# Patient Record
Sex: MALE | Race: WHITE | HISPANIC OR LATINO | URBAN - METROPOLITAN AREA
[De-identification: names, ages, dates, MRNs, and addresses within clinical notes are randomized per-mention and may not be internally consistent; named-entity substitution may affect disease eponyms.]

---

## 2023-05-30 ENCOUNTER — OFFICE VISIT (OUTPATIENT)
Dept: FAMILY MEDICINE CLINIC | Facility: CLINIC | Age: 53
End: 2023-05-30

## 2023-05-30 VITALS
TEMPERATURE: 97.8 F | OXYGEN SATURATION: 98 % | SYSTOLIC BLOOD PRESSURE: 170 MMHG | WEIGHT: 206 LBS | DIASTOLIC BLOOD PRESSURE: 90 MMHG | BODY MASS INDEX: 28.84 KG/M2 | HEART RATE: 84 BPM | HEIGHT: 71 IN

## 2023-05-30 DIAGNOSIS — G62.9 PERIPHERAL POLYNEUROPATHY: ICD-10-CM

## 2023-05-30 DIAGNOSIS — L98.9 NON-HEALING SKIN LESION: ICD-10-CM

## 2023-05-30 DIAGNOSIS — S40.812A ABRASION OF SKIN OF LEFT UPPER ARM: ICD-10-CM

## 2023-05-30 DIAGNOSIS — Z12.5 PROSTATE CANCER SCREENING: ICD-10-CM

## 2023-05-30 DIAGNOSIS — Z12.11 COLON CANCER SCREENING: Primary | ICD-10-CM

## 2023-05-30 DIAGNOSIS — I10 PRIMARY HYPERTENSION: ICD-10-CM

## 2023-05-30 DIAGNOSIS — N40.1 BENIGN PROSTATIC HYPERPLASIA WITH LOWER URINARY TRACT SYMPTOMS, SYMPTOM DETAILS UNSPECIFIED: ICD-10-CM

## 2023-05-30 DIAGNOSIS — E78.00 HYPERCHOLESTEREMIA: ICD-10-CM

## 2023-05-30 DIAGNOSIS — E11.65 UNCONTROLLED TYPE 2 DIABETES MELLITUS WITH HYPERGLYCEMIA (HCC): ICD-10-CM

## 2023-05-30 RX ORDER — ATORVASTATIN CALCIUM 20 MG/1
20 TABLET, FILM COATED ORAL DAILY
Qty: 90 TABLET | Refills: 3 | Status: SHIPPED | OUTPATIENT
Start: 2023-05-30

## 2023-05-30 RX ORDER — GABAPENTIN 300 MG/1
300 CAPSULE ORAL 2 TIMES DAILY
COMMUNITY
End: 2023-05-30 | Stop reason: SDUPTHER

## 2023-05-30 RX ORDER — GABAPENTIN 300 MG/1
300 CAPSULE ORAL 2 TIMES DAILY
Qty: 180 CAPSULE | Refills: 3 | Status: SHIPPED | OUTPATIENT
Start: 2023-05-30

## 2023-05-30 RX ORDER — MIRTAZAPINE 15 MG/1
1 TABLET, FILM COATED ORAL DAILY
COMMUNITY
Start: 2023-05-30

## 2023-05-30 RX ORDER — GLIPIZIDE 10 MG/1
1 TABLET ORAL 2 TIMES DAILY
COMMUNITY
Start: 2023-05-23

## 2023-05-30 RX ORDER — BUPROPION HYDROCHLORIDE 150 MG/1
2 TABLET ORAL DAILY
COMMUNITY
Start: 2023-05-16

## 2023-05-30 RX ORDER — LISINOPRIL AND HYDROCHLOROTHIAZIDE 25; 20 MG/1; MG/1
1 TABLET ORAL DAILY
COMMUNITY

## 2023-05-30 RX ORDER — CARVEDILOL 6.25 MG/1
1 TABLET ORAL 2 TIMES DAILY
COMMUNITY

## 2023-05-30 RX ORDER — FUROSEMIDE 20 MG/1
20 TABLET ORAL
Qty: 90 TABLET | Refills: 3 | Status: SHIPPED | OUTPATIENT
Start: 2023-05-30

## 2023-05-30 RX ORDER — INSULIN HUMAN 100 [IU]/ML
20 INJECTION, SUSPENSION SUBCUTANEOUS
COMMUNITY
Start: 2023-05-16

## 2023-05-30 RX ORDER — CLONIDINE HYDROCHLORIDE 0.1 MG/1
1 TABLET ORAL DAILY
COMMUNITY
Start: 2023-05-16

## 2023-05-30 RX ORDER — LEVOTHYROXINE SODIUM 0.12 MG/1
125 TABLET ORAL DAILY
COMMUNITY

## 2023-05-30 RX ORDER — PEN NEEDLE, DIABETIC 29 G X1/2"
NEEDLE, DISPOSABLE MISCELLANEOUS 2 TIMES DAILY
COMMUNITY
Start: 2023-05-16

## 2023-05-30 RX ORDER — BUPRENORPHINE AND NALOXONE 8; 2 MG/1; MG/1
3 FILM, SOLUBLE BUCCAL; SUBLINGUAL DAILY
COMMUNITY
Start: 2023-05-26

## 2023-05-30 RX ORDER — FUROSEMIDE 20 MG/1
20 TABLET ORAL
COMMUNITY
End: 2023-05-30 | Stop reason: SDUPTHER

## 2023-05-30 RX ORDER — NALOXONE HYDROCHLORIDE 4 MG/.1ML
1 SPRAY NASAL ONCE AS NEEDED
COMMUNITY
Start: 2023-05-16

## 2023-05-30 RX ORDER — ATORVASTATIN CALCIUM 20 MG/1
20 TABLET, FILM COATED ORAL DAILY
COMMUNITY
End: 2023-05-30 | Stop reason: SDUPTHER

## 2023-05-30 RX ORDER — TAMSULOSIN HYDROCHLORIDE 0.4 MG/1
1 CAPSULE ORAL DAILY
COMMUNITY
Start: 2023-05-16

## 2023-05-30 RX ORDER — HYDRALAZINE HYDROCHLORIDE 25 MG/1
25 TABLET, FILM COATED ORAL 3 TIMES DAILY
COMMUNITY

## 2023-05-30 RX ORDER — ONDANSETRON 4 MG/1
1 TABLET, ORALLY DISINTEGRATING ORAL EVERY 8 HOURS PRN
COMMUNITY
Start: 2023-05-16

## 2023-05-30 NOTE — PROGRESS NOTES
Name: Milan Davila      : 1970      MRN: [id number]  Encounter Provider: Julieth Jacobs DO  Encounter Date: 2023   Encounter department: [facility]     Chief Complaint   Patient presents with   • New Patient Visit   • Establish Care   • Arm Pain     X 1 month/L side   BMI Counseling: Body mass index is 28 73 kg/m²  The BMI is above normal  Nutrition recommendations include reducing portion sizes, consuming healthier snacks, decreasing soda and/or juice intake, moderation in carbohydrate intake and increasing intake of lean protein  1  Colon cancer screening  -     Cologuard    2  Hypercholesteremia  -     atorvastatin (LIPITOR) 20 mg tablet; Take 1 tablet (20 mg total) by mouth daily  -     Lipid panel; Future    3  Peripheral polyneuropathy  -     gabapentin (NEURONTIN) 300 mg capsule; Take 1 capsule (300 mg total) by mouth 2 (two) times a day    4  Primary hypertension  -     furosemide (LASIX) 20 mg tablet; Take 1 tablet (20 mg total) by mouth daily TPN  -     Ambulatory Referral to Nephrology; Future    5  Abrasion of skin of left upper arm  -     mupirocin (BACTROBAN) 2 % ointment; Apply topically 3 (three) times a day  -     Ambulatory Referral to Wound Care; Future    6  Prostate cancer screening  -     PSA, Total Screen; Future    7  Uncontrolled type 2 diabetes mellitus with hyperglycemia (HCC)  -     Basic metabolic panel; Future  -     CBC and Platelet; Future  -     Hepatic function panel; Future  -     HEMOGLOBIN A1C W/ EAG ESTIMATION; Future  -     Albumin / creatinine urine ratio; Future  -     Hemoglobin A1C; Future; Expected date: 2023    8  Non-healing skin lesion    9   Benign prostatic hyperplasia with lower urinary tract symptoms, symptom details unspecified      PHQ-2/9 Depression Screening    Little interest or pleasure in doing things: 0 - not at all  Feeling down, depressed, or hopeless: 0 - not at all  PHQ-2 Score: 0  PHQ-2 Interpretation: Negative depression screen              Subjective     Establish  Complex, pleasant patient  Recent moved back from [place]  PMH: DM2, HTN  Peripheral Neuropathy, drug use, non-healing lesion left forearm (site of prior fracture)  A1c 7 8 % past February  Pt states only place to draw blood is right leg  Review of Systems   Constitutional: Negative  HENT: Negative  Eyes: Negative  Respiratory: Negative  Cardiovascular: Negative  Gastrointestinal: Negative  Genitourinary: Negative  Musculoskeletal: Negative  Skin: Positive for wound  Nonhealing lesion left forearm  Site of previous fracture  Neurological: Positive for numbness  Negative for headaches  Psychiatric/Behavioral: Negative          Past Medical History:   Diagnosis Date   • Diabetes 1 5, managed as type 2 ([facility] )    • Hyperthyroidism      Past Surgical History:   Procedure Laterality Date   • ABOVE ELBOW ARM AMPUTATION Right      Family History   Problem Relation Age of Onset   • Diabetes Mother    • Hypertension Mother    • Thyroid disease Sister    • Hypertension Sister      Social History     Socioeconomic History   • Marital status: /Civil Union     Spouse name: None   • Number of children: None   • Years of education: None   • Highest education level: None   Occupational History   • None   Tobacco Use   • Smoking status: Every Day     Packs/day: 0 50     Types: Cigarettes   • Smokeless tobacco: Never   Vaping Use   • Vaping Use: Every day   Substance and Sexual Activity   • Alcohol use: Not Currently   • Drug use: Not Currently   • Sexual activity: None   Other Topics Concern   • None   Social History Narrative   • None     Social Determinants of Health     Financial Resource Strain: Not on file   Food Insecurity: Not on file   Transportation Needs: Not on file   Physical Activity: Not on file   Stress: Not on file   Social Connections: Not on file   Intimate Partner Violence: Not on file "  Housing Stability: Not on file     Current Outpatient Medications on File Prior to Visit   Medication Sig   • BD Insulin Syringe U/F 31G X 5/16\" 1 ML MISC 2 (two) times a day   • buprenorphine-naloxone (Suboxone) 8-2 mg Place 3 Film under the tongue in the morning   • buPROPion (WELLBUTRIN XL) 150 mg 24 hr tablet Take 2 tablets by mouth in the morning   • cloNIDine (CATAPRES) 0 1 mg tablet Take 1 tablet by mouth in the morning   • glipiZIDE (GLUCOTROL) 10 mg tablet Take 1 tablet by mouth 2 (two) times a day   • HumuLIN 70/30 (70-30) 100 UNIT/ML subcutaneous injection INJECT 15 UNITS IN MORNING AND 20 IN EVENING SUBCUTANEOUS TWICE A DAY 90 DAYS   • hydrALAZINE (APRESOLINE) 25 mg tablet Take 25 mg by mouth 3 (three) times a day   • levothyroxine 125 mcg tablet Take 125 mcg by mouth daily   • lisinopril-hydrochlorothiazide (PRINZIDE,ZESTORETIC) 20-25 MG per tablet Take 1 tablet by mouth daily   • metFORMIN (GLUCOPHAGE) 1000 MG tablet Take 1 tablet by mouth 2 (two) times a day   • mirtazapine (REMERON) 15 mg tablet Take 1 tablet by mouth in the morning   • naloxone (NARCAN) 4 mg/0 1 mL nasal spray 1 spray into each nostril once as needed   • ondansetron (ZOFRAN-ODT) 4 mg disintegrating tablet Take 1 tablet by mouth every 8 (eight) hours as needed   • tamsulosin (FLOMAX) 0 4 mg Take 1 capsule by mouth in the morning   • [DISCONTINUED] atorvastatin (LIPITOR) 20 mg tablet Take 20 mg by mouth daily   • [DISCONTINUED] furosemide (LASIX) 20 mg tablet Take 20 mg by mouth daily TPN   • [DISCONTINUED] gabapentin (NEURONTIN) 300 mg capsule Take 300 mg by mouth 2 (two) times a day   • carvedilol (COREG) 6 25 mg tablet Take 1 tablet by mouth 2 (two) times a day   • metFORMIN (GLUCOPHAGE) 500 mg tablet Take 1 tablet by mouth 2 (two) times a day (Patient not taking: Reported on 5/30/2023)     No Known Allergies    There is no immunization history on file for this patient      Objective     /90   Pulse 84   Temp 97 8 °F (36 6 " "°C) (Temporal)   Ht 5' 11\" (1 803 m)   Wt 93 4 kg (206 lb)   SpO2 98%   BMI 28 73 kg/m²     Physical Exam  Constitutional:       Appearance: He is well-developed  HENT:      Head: Normocephalic and atraumatic  Right Ear: Tympanic membrane, ear canal and external ear normal       Left Ear: Tympanic membrane, ear canal and external ear normal       Nose: Nose normal       Mouth/Throat:      Mouth: Mucous membranes are moist       Pharynx: Oropharynx is clear  Eyes:      Conjunctiva/sclera: Conjunctivae normal       Pupils: Pupils are equal, round, and reactive to light  Cardiovascular:      Rate and Rhythm: Normal rate and regular rhythm  Pulses: Normal pulses  Heart sounds: Normal heart sounds  Comments: Good posterior tibial pulses  Pulmonary:      Effort: Pulmonary effort is normal       Breath sounds: Normal breath sounds  Abdominal:      General: Abdomen is flat  Bowel sounds are normal       Palpations: Abdomen is soft  Musculoskeletal:      Cervical back: Normal range of motion and neck supple  Skin:     General: Skin is warm and dry  Comments: Scabbing lesion left posterior forearm  Neurological:      Mental Status: He is alert and oriented to person, place, and time  Deep Tendon Reflexes: Reflexes are normal and symmetric  Psychiatric:         Mood and Affect: Mood normal          Behavior: Behavior normal          Thought Content:  Thought content normal          Judgment: Judgment normal        Tyrell Flores DO  "

## 2023-06-07 ENCOUNTER — TELEPHONE (OUTPATIENT)
Dept: FAMILY MEDICINE CLINIC | Facility: CLINIC | Age: 53
End: 2023-06-07

## 2023-06-07 ENCOUNTER — OFFICE VISIT (OUTPATIENT)
Dept: WOUND CARE | Facility: CLINIC | Age: 53
End: 2023-06-07
Payer: COMMERCIAL

## 2023-06-07 ENCOUNTER — APPOINTMENT (OUTPATIENT)
Dept: LAB | Facility: HOSPITAL | Age: 53
End: 2023-06-07
Payer: COMMERCIAL

## 2023-06-07 VITALS
DIASTOLIC BLOOD PRESSURE: 98 MMHG | HEIGHT: 71 IN | SYSTOLIC BLOOD PRESSURE: 158 MMHG | BODY MASS INDEX: 28.84 KG/M2 | HEART RATE: 80 BPM | TEMPERATURE: 98.5 F | WEIGHT: 206 LBS | RESPIRATION RATE: 16 BRPM

## 2023-06-07 DIAGNOSIS — S41.102A OPEN WOUND OF LEFT UPPER EXTREMITY, INITIAL ENCOUNTER: Primary | ICD-10-CM

## 2023-06-07 DIAGNOSIS — E11.65 UNCONTROLLED TYPE 2 DIABETES MELLITUS WITH HYPERGLYCEMIA (HCC): ICD-10-CM

## 2023-06-07 DIAGNOSIS — Z12.5 PROSTATE CANCER SCREENING: ICD-10-CM

## 2023-06-07 DIAGNOSIS — Z79.4 DIABETES MELLITUS TYPE 2, INSULIN DEPENDENT (HCC): ICD-10-CM

## 2023-06-07 DIAGNOSIS — E78.00 HYPERCHOLESTEREMIA: ICD-10-CM

## 2023-06-07 DIAGNOSIS — E11.9 DIABETES MELLITUS TYPE 2, INSULIN DEPENDENT (HCC): ICD-10-CM

## 2023-06-07 PROBLEM — S40.812A: Status: ACTIVE | Noted: 2023-06-07

## 2023-06-07 PROCEDURE — 11042 DBRDMT SUBQ TIS 1ST 20SQCM/<: CPT | Performed by: FAMILY MEDICINE

## 2023-06-07 PROCEDURE — 99204 OFFICE O/P NEW MOD 45 MIN: CPT | Performed by: FAMILY MEDICINE

## 2023-06-07 PROCEDURE — 99213 OFFICE O/P EST LOW 20 MIN: CPT

## 2023-06-07 RX ORDER — LIDOCAINE 40 MG/G
CREAM TOPICAL ONCE
Status: COMPLETED | OUTPATIENT
Start: 2023-06-07 | End: 2023-06-07

## 2023-06-07 RX ADMIN — LIDOCAINE: 40 CREAM TOPICAL at 10:00

## 2023-06-07 NOTE — PROGRESS NOTES
Patient ID: Mirza Vegas is a 47 y o  male Date of Birth 1970       Chief Complaint   Patient presents with   • New Patient Visit     Left arm wound        Allergies:  Patient has no known allergies  Diagnosis:      Diagnosis ICD-10-CM Associated Orders   1  Open wound of left upper extremity, initial encounter  S41 102A lidocaine (LMX) 4 % cream     Wound cleansing and dressings     mupirocin (BACTROBAN) 2 % ointment     Debridement      2  Diabetes mellitus type 2, insulin dependent (HCC)  E11 9     Z79 4               Assessment & Plan:  • Open wound at the site of old scar tissue  Has been present for approximately 7 months but recently improving since starting mupirocin ointment a week ago  There is some concern about squamous cell carcinoma since he did have it on the right upper extremity which eventually led to amputation  I did discuss possible biopsy but the patient was reluctant at this time  He states that he is watches his skin very carefully because he was aware of what happened on the right   o Surgical debridement   o Mupirocin ointment and bordered gauze daily  o We will keep an eye on this and consider biopsy if things do not improve significantly  • Type 2 diabetes, inadequately controlled  A1C results reviewed with the patient today  Patient is scheduled to have an A1c done later this month  Subjective:   6/7/2023: This is a 69-year-old male referred to the wound center because of a nonhealing wound of the left forearm  I last saw this patient in the wound center in 2015  At that time 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, 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  At that time he was in jail  The patient had an injury to his left forearm many years ago  He had scar tissue in this area and he states from time to time with minor trauma it opens    This time, his dog hit his arm and he developed what sounds like a hematoma  This eventually disappeared but it opened and has been open to some degree for the past 7 months  He recently saw his primary care physician who prescribed mupirocin ointment  He states that since using that in the last week, it has significantly improved  The patient has type 2 diabetes and had been out of medication until March of this year  He was restarted at that time and he states that his blood sugars are ranging in the 160s  In December, when he lived in [place], his A1c was approximately 7 2        The following portions of the patient's history were reviewed and updated as appropriate:   Patient Active Problem List   Diagnosis   • Open wound of left upper extremity   • Diabetes mellitus type 2, insulin dependent ([facility] )     Past Medical History:   Diagnosis Date   • Diabetes 1 5, managed as type 2 ([facility] )    • Hyperthyroidism      Past Surgical History:   Procedure Laterality Date   • ABOVE ELBOW ARM AMPUTATION Right      Family History   Problem Relation Age of Onset   • Diabetes Mother    • Hypertension Mother    • Thyroid disease Sister    • Hypertension Sister       Social History     Socioeconomic History   • Marital status: /Civil Union     Spouse name: None   • Number of children: None   • Years of education: None   • Highest education level: None   Occupational History   • None   Tobacco Use   • Smoking status: Every Day     Packs/day: 0 50     Types: Cigarettes   • Smokeless tobacco: Never   Vaping Use   • Vaping Use: Every day   Substance and Sexual Activity   • Alcohol use: Not Currently   • Drug use: Not Currently   • Sexual activity: None   Other Topics Concern   • None   Social History Narrative   • None     Social Determinants of Health     Financial Resource Strain: Not on file   Food Insecurity: Not on file   Transportation Needs: Not on file   Physical Activity: Not on file   Stress: Not on file   Social Connections: Not on file   Intimate Partner Violence: "Not on file   Housing Stability: Not on file        Current Outpatient Medications:   •  atorvastatin (LIPITOR) 20 mg tablet, Take 1 tablet (20 mg total) by mouth daily, Disp: 90 tablet, Rfl: 3  •  BD Insulin Syringe U/F 31G X 5/16\" 1 ML MISC, 2 (two) times a day, Disp: , Rfl:   •  buprenorphine-naloxone (Suboxone) 8-2 mg, Place 3 Film under the tongue in the morning, Disp: , Rfl:   •  carvedilol (COREG) 6 25 mg tablet, Take 1 tablet by mouth 2 (two) times a day, Disp: , Rfl:   •  cloNIDine (CATAPRES) 0 1 mg tablet, Take 1 tablet by mouth in the morning, Disp: , Rfl:   •  furosemide (LASIX) 20 mg tablet, Take 1 tablet (20 mg total) by mouth daily TPN, Disp: 90 tablet, Rfl: 3  •  gabapentin (NEURONTIN) 300 mg capsule, Take 1 capsule (300 mg total) by mouth 2 (two) times a day, Disp: 180 capsule, Rfl: 3  •  glipiZIDE (GLUCOTROL) 10 mg tablet, Take 1 tablet by mouth 2 (two) times a day, Disp: , Rfl:   •  HumuLIN 70/30 (70-30) 100 UNIT/ML subcutaneous injection, 20 Units 2 (two) times a day before meals, Disp: , Rfl:   •  hydrALAZINE (APRESOLINE) 25 mg tablet, Take 25 mg by mouth 3 (three) times a day, Disp: , Rfl:   •  levothyroxine 125 mcg tablet, Take 125 mcg by mouth daily, Disp: , Rfl:   •  lisinopril-hydrochlorothiazide (PRINZIDE,ZESTORETIC) 20-25 MG per tablet, Take 1 tablet by mouth daily, Disp: , Rfl:   •  metFORMIN (GLUCOPHAGE) 1000 MG tablet, Take 1 tablet by mouth 2 (two) times a day, Disp: , Rfl:   •  mirtazapine (REMERON) 15 mg tablet, Take 1 tablet by mouth in the morning, Disp: , Rfl:   •  mupirocin (BACTROBAN) 2 % ointment, apply to wound as directed, Disp: 22 g, Rfl: 3  •  naloxone (NARCAN) 4 mg/0 1 mL nasal spray, 1 spray into each nostril once as needed, Disp: , Rfl:   •  tamsulosin (FLOMAX) 0 4 mg, Take 1 capsule by mouth in the morning, Disp: , Rfl:   •  buPROPion (WELLBUTRIN XL) 150 mg 24 hr tablet, Take 2 tablets by mouth in the morning (Patient not taking: Reported on 6/7/2023), Disp: , Rfl:   • " " metFORMIN (GLUCOPHAGE) 500 mg tablet, Take 1 tablet by mouth 2 (two) times a day (Patient not taking: Reported on 5/30/2023), Disp: , Rfl:   •  ondansetron (ZOFRAN-ODT) 4 mg disintegrating tablet, Take 1 tablet by mouth every 8 (eight) hours as needed (Patient not taking: Reported on 6/7/2023), Disp: , Rfl:   No current facility-administered medications for this visit  Review of Systems   Constitutional: Negative for chills, fatigue and fever  HENT: Negative for congestion, hearing loss and postnasal drip  Respiratory: Negative for cough and shortness of breath  Cardiovascular: Negative for leg swelling  Musculoskeletal: Negative for gait problem  Skin: Positive for wound (Left forearm)  Negative for rash  Neurological: Negative for numbness  Hematological: Does not bruise/bleed easily  Psychiatric/Behavioral: Negative  Objective:  /98   Pulse 80   Temp 98 5 °F (36 9 °C)   Resp 16   Ht 5' 11\" (1 803 m)   Wt 93 4 kg (206 lb)   BMI 28 73 kg/m²         Physical Exam  Vitals and nursing note reviewed  Constitutional:       Appearance: Normal appearance  He is well-developed and normal weight  HENT:      Head: Normocephalic and atraumatic  Cardiovascular:      Rate and Rhythm: Normal rate  Pulmonary:      Effort: Pulmonary effort is normal    Musculoskeletal:        Arms:       Comments: Amputation right upper extremity   Skin:     General: Skin is warm and dry  Findings: Wound present  No erythema  Comments: Open wound in the midst of chronic scar tissue  Extensive necrotic tissue and eschar as well as slough and fibrin  No surrounding erythema and no obvious signs of infection  Neurological:      Mental Status: He is alert and oriented to person, place, and time  Psychiatric:         Attention and Perception: Attention normal          Mood and Affect: Mood and affect normal          Behavior: Behavior is cooperative           Cognition and Memory: " "Cognition normal          Wound 06/07/23 Other (comment) Arm Left;Posterior; Lower (Active)   Wound Image       06/07/23 1008   Wound Description Beefy red;Pink;Dry;Yellow;Slough;Brown;Eschar;Epithelialization 06/07/23 0931   Heidy-wound Assessment Dry;Scar Tissue 06/07/23 0931   Wound Length (cm) 3 8 cm 06/07/23 0931   Wound Width (cm) 2 2 cm 06/07/23 0931   Wound Depth (cm) 0 2 cm 06/07/23 0931   Wound Surface Area (cm^2) 8 36 cm^2 06/07/23 0931   Wound Volume (cm^3) 1 672 cm^3 06/07/23 0931   Calculated Wound Volume (cm^3) 1 67 cm^3 06/07/23 0931   Drainage Amount Small 06/07/23 0931   Drainage Description Serous; Yellow 06/07/23 0931   Non-staged Wound Description Full thickness 06/07/23 0931   Dressing Status Other (Comment) 06/07/23 0931                            Debridement   Wound 06/07/23 Other (comment) Arm Left;Posterior; Lower    Universal Protocol:  Consent: Verbal consent obtained  Written consent obtained  Consent given by: patient  Time out: Immediately prior to procedure a \"time out\" was called to verify the correct patient, procedure, equipment, support staff and site/side marked as required    Patient understanding: patient states understanding of the procedure being performed  Patient identity confirmed: verbally with patient      Performed by: physician  Debridement type: surgical  Level of debridement: subcutaneous tissue  Pain control: lidocaine 4%  Post-debridement measurements  Length (cm): 3 8  Width (cm): 2 2  Depth (cm): 0 3  Percent debrided: 100%  Surface Area (cm^2): 8 36  Area debrided (cm^2): 8 36  Volume (cm^3): 2 51  Tissue and other material debrided: dermis and subcutaneous tissue  Devitalized tissue debrided: fibrin, necrotic debris and slough  Instrument(s) utilized: curette  Bleeding: large  Hemostasis obtained with: pressure and silver nitrate  Procedural pain (0-10): 4  Post-procedural pain: 2   Response to treatment: procedure was tolerated well                     No results " "found for: \"HGBA1C\"    Wound Instructions:  Orders Placed This Encounter   Procedures   • Wound cleansing and dressings     Left arm wound:     Standing Status:   Future     Standing Expiration Date:   6/7/2024     Scheduling Instructions:      Left arm wound: Shower on dressing change days   Cleanse wound with mild soap and water (recommend dove unscented)  Pat dry with gauze            Apply mupirocin       Cover with mepilex bordered flex (sent with you today)      Change dressing 3 times a week             Above performed at wound care center today            Wound was silver nitrated at wound care center today, may have a greyish appearance   • Debridement     This order was created via procedure documentation             Ada Hernadez MD, CHT, CWS    Portions of the record may have been created with voice recognition software  Occasional wrong word or \"sound alike\" substitutions may have occurred due to the inherent limitations of voice recognition software  Read the chart carefully and recognize, using context, where substitutions have occurred      "

## 2023-06-07 NOTE — PATIENT INSTRUCTIONS
Orders Placed This Encounter   Procedures    Wound cleansing and dressings     Left arm wound:     Standing Status:   Future     Standing Expiration Date:   6/7/2024     Scheduling Instructions:      Left arm wound: Shower on dressing change days   Cleanse wound with mild soap and water (recommend dove unscented)   Pat dry with gauze            Apply mupirocin       Cover with mepilex bordered flex (sent with you today)      Change dressing 3 times a week             Above performed at wound care center today            Wound was silver nitrated at wound care center today, may have a greyish appearance

## 2023-06-07 NOTE — LETTER
June 7, 2023     Kalin Jon DO  [address]    Patient: Guido Koyanagi   YOB: 1970   Date of Visit: 6/7/2023       Dear Dr Greco Will: Thank you for referring Guido Koyanagi to me for evaluation  Below are my notes for this consultation  If you have questions, please do not hesitate to call me  I look forward to following your patient along with you  Sincerely,        Heidi Goss MD, CHT, CWS        CC: No Recipients    Heidi Goss MD  6/7/2023 12:16 PM  Addendum  Patient ID: Guido Koyanagi is a 47 y o  male Date of Birth 1970       Chief Complaint   Patient presents with   • New Patient Visit     Left arm wound        Allergies:  Patient has no known allergies  Diagnosis:      Diagnosis ICD-10-CM Associated Orders   1  Open wound of left upper extremity, initial encounter  S41 102A lidocaine (LMX) 4 % cream     Wound cleansing and dressings     mupirocin (BACTROBAN) 2 % ointment     Debridement      2  Diabetes mellitus type 2, insulin dependent (HCC)  E11 9     Z79 4               Assessment & Plan:  Open wound at the site of old scar tissue  Has been present for approximately 7 months but recently improving since starting mupirocin ointment a week ago  There is some concern about squamous cell carcinoma since he did have it on the right upper extremity which eventually led to amputation  I did discuss possible biopsy but the patient was reluctant at this time  He states that he is watches his skin very carefully because he was aware of what happened on the right  Surgical debridement  Mupirocin ointment and bordered gauze daily  We will keep an eye on this and consider biopsy if things do not improve significantly  Type 2 diabetes, inadequately controlled  A1C results reviewed with the patient today  Patient is scheduled to have an A1c done later this month  Subjective:   6/7/2023:  This is a 76-year-old male referred to the wound center because of a nonhealing wound of the left forearm  I last saw this patient in the wound center in 2015  At that time 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, 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  [place]  The patient had an injury to his left forearm many years ago  He had scar tissue in this area and he states from time to time with minor trauma it opens  This time, his dog hit his arm and he developed what sounds like a hematoma  This eventually disappeared but it opened and has been open to some degree for the past 7 months  He recently saw his primary care physician who prescribed mupirocin ointment  He states that since using that in the last week, it has significantly improved  The patient has type 2 diabetes and had been out of medication until March of this year  He was restarted at that time and he states that his blood sugars are ranging in the 160s  In December, when he lived in [place], his A1c was approximately 7 2        The following portions of the patient's history were reviewed and updated as appropriate:   Patient Active Problem List   Diagnosis   • Open wound of left upper extremity   • Diabetes mellitus type 2, insulin dependent ([address] )     Past Medical History:   Diagnosis Date   • Diabetes 1 5, managed as type 2 ([address] )    • Hyperthyroidism      Past Surgical History:   Procedure Laterality Date   • ABOVE ELBOW ARM AMPUTATION Right      Family History   Problem Relation Age of Onset   • Diabetes Mother    • Hypertension Mother    • Thyroid disease Sister    • Hypertension Sister       Social History     Socioeconomic History   • Marital status: /Civil Union     Spouse name: None   • Number of children: None   • Years of education: None   • Highest education level: None   Occupational History   • None   Tobacco Use   • Smoking status: Every Day "    Packs/day: 0 50     Types: Cigarettes   • Smokeless tobacco: Never   Vaping Use   • Vaping Use: Every day   Substance and Sexual Activity   • Alcohol use: Not Currently   • Drug use: Not Currently   • Sexual activity: None   Other Topics Concern   • None   Social History Narrative   • None     Social Determinants of Health     Financial Resource Strain: Not on file   Food Insecurity: Not on file   Transportation Needs: Not on file   Physical Activity: Not on file   Stress: Not on file   Social Connections: Not on file   Intimate Partner Violence: Not on file   Housing Stability: Not on file        Current Outpatient Medications:   •  atorvastatin (LIPITOR) 20 mg tablet, Take 1 tablet (20 mg total) by mouth daily, Disp: 90 tablet, Rfl: 3  •  BD Insulin Syringe U/F 31G X 5/16\" 1 ML MISC, 2 (two) times a day, Disp: , Rfl:   •  buprenorphine-naloxone (Suboxone) 8-2 mg, Place 3 Film under the tongue in the morning, Disp: , Rfl:   •  carvedilol (COREG) 6 25 mg tablet, Take 1 tablet by mouth 2 (two) times a day, Disp: , Rfl:   •  cloNIDine (CATAPRES) 0 1 mg tablet, Take 1 tablet by mouth in the morning, Disp: , Rfl:   •  furosemide (LASIX) 20 mg tablet, Take 1 tablet (20 mg total) by mouth daily TPN, Disp: 90 tablet, Rfl: 3  •  gabapentin (NEURONTIN) 300 mg capsule, Take 1 capsule (300 mg total) by mouth 2 (two) times a day, Disp: 180 capsule, Rfl: 3  •  glipiZIDE (GLUCOTROL) 10 mg tablet, Take 1 tablet by mouth 2 (two) times a day, Disp: , Rfl:   •  HumuLIN 70/30 (70-30) 100 UNIT/ML subcutaneous injection, 20 Units 2 (two) times a day before meals, Disp: , Rfl:   •  hydrALAZINE (APRESOLINE) 25 mg tablet, Take 25 mg by mouth 3 (three) times a day, Disp: , Rfl:   •  levothyroxine 125 mcg tablet, Take 125 mcg by mouth daily, Disp: , Rfl:   •  lisinopril-hydrochlorothiazide (PRINZIDE,ZESTORETIC) 20-25 MG per tablet, Take 1 tablet by mouth daily, Disp: , Rfl:   •  metFORMIN (GLUCOPHAGE) 1000 MG tablet, Take 1 tablet by " "mouth 2 (two) times a day, Disp: , Rfl:   •  mirtazapine (REMERON) 15 mg tablet, Take 1 tablet by mouth in the morning, Disp: , Rfl:   •  mupirocin (BACTROBAN) 2 % ointment, apply to wound as directed, Disp: 22 g, Rfl: 3  •  naloxone (NARCAN) 4 mg/0 1 mL nasal spray, 1 spray into each nostril once as needed, Disp: , Rfl:   •  tamsulosin (FLOMAX) 0 4 mg, Take 1 capsule by mouth in the morning, Disp: , Rfl:   •  buPROPion (WELLBUTRIN XL) 150 mg 24 hr tablet, Take 2 tablets by mouth in the morning (Patient not taking: Reported on 6/7/2023), Disp: , Rfl:   •  metFORMIN (GLUCOPHAGE) 500 mg tablet, Take 1 tablet by mouth 2 (two) times a day (Patient not taking: Reported on 5/30/2023), Disp: , Rfl:   •  ondansetron (ZOFRAN-ODT) 4 mg disintegrating tablet, Take 1 tablet by mouth every 8 (eight) hours as needed (Patient not taking: Reported on 6/7/2023), Disp: , Rfl:   No current facility-administered medications for this visit  Review of Systems   Constitutional: Negative for chills, fatigue and fever  HENT: Negative for congestion, hearing loss and postnasal drip  Respiratory: Negative for cough and shortness of breath  Cardiovascular: Negative for leg swelling  Musculoskeletal: Negative for gait problem  Skin: Positive for wound (Left forearm)  Negative for rash  Neurological: Negative for numbness  Hematological: Does not bruise/bleed easily  Psychiatric/Behavioral: Negative  Objective:  /98   Pulse 80   Temp 98 5 °F (36 9 °C)   Resp 16   Ht 5' 11\" (1 803 m)   Wt 93 4 kg (206 lb)   BMI 28 73 kg/m²         Physical Exam  Vitals and nursing note reviewed  Constitutional:       Appearance: Normal appearance  He is well-developed and normal weight  HENT:      Head: Normocephalic and atraumatic  Cardiovascular:      Rate and Rhythm: Normal rate     Pulmonary:      Effort: Pulmonary effort is normal    Musculoskeletal:        Arms:       Comments: Amputation right upper extremity " "  Skin:     General: Skin is warm and dry  Findings: Wound present  No erythema  Comments: Open wound in the midst of chronic scar tissue  Extensive necrotic tissue and eschar as well as slough and fibrin  No surrounding erythema and no obvious signs of infection  Neurological:      Mental Status: He is alert and oriented to person, place, and time  Psychiatric:         Attention and Perception: Attention normal          Mood and Affect: Mood and affect normal          Behavior: Behavior is cooperative  Cognition and Memory: Cognition normal          Wound 06/07/23 Other (comment) Arm Left;Posterior; Lower (Active)   Wound Image       06/07/23 1008   Wound Description Beefy red;Pink;Dry;Yellow;Slough;Brown;Eschar;Epithelialization 06/07/23 0931   Heidy-wound Assessment Dry;Scar Tissue 06/07/23 0931   Wound Length (cm) 3 8 cm 06/07/23 0931   Wound Width (cm) 2 2 cm 06/07/23 0931   Wound Depth (cm) 0 2 cm 06/07/23 0931   Wound Surface Area (cm^2) 8 36 cm^2 06/07/23 0931   Wound Volume (cm^3) 1 672 cm^3 06/07/23 0931   Calculated Wound Volume (cm^3) 1 67 cm^3 06/07/23 0931   Drainage Amount Small 06/07/23 0931   Drainage Description Serous; Yellow 06/07/23 0931   Non-staged Wound Description Full thickness 06/07/23 0931   Dressing Status Other (Comment) 06/07/23 0931                            Debridement   Wound 06/07/23 Other (comment) Arm Left;Posterior; Lower    Universal Protocol:  Consent: Verbal consent obtained  Written consent obtained  Consent given by: patient  Time out: Immediately prior to procedure a \"time out\" was called to verify the correct patient, procedure, equipment, support staff and site/side marked as required    Patient understanding: patient states understanding of the procedure being performed  Patient identity confirmed: verbally with patient      Performed by: physician  Debridement type: surgical  Level of debridement: subcutaneous tissue  Pain control: lidocaine " "4%  Post-debridement measurements  Length (cm): 3 8  Width (cm): 2 2  Depth (cm): 0 3  Percent debrided: 100%  Surface Area (cm^2): 8 36  Area debrided (cm^2): 8 36  Volume (cm^3): 2 51  Tissue and other material debrided: dermis and subcutaneous tissue  Devitalized tissue debrided: fibrin, necrotic debris and slough  Instrument(s) utilized: curette  Bleeding: large  Hemostasis obtained with: pressure and silver nitrate  Procedural pain (0-10): 4  Post-procedural pain: 2   Response to treatment: procedure was tolerated well                     No results found for: \"HGBA1C\"    Wound Instructions:  Orders Placed This Encounter   Procedures   • Wound cleansing and dressings     Left arm wound:     Standing Status:   Future     Standing Expiration Date:   6/7/2024     Scheduling Instructions:      Left arm wound: Shower on dressing change days   Cleanse wound with mild soap and water (recommend dove unscented)  Pat dry with gauze            Apply mupirocin       Cover with mepilex bordered flex (sent with you today)      Change dressing 3 times a week             Above performed at wound care center today            Wound was silver nitrated at wound care center today, may have a greyish appearance   • Debridement     This order was created via procedure documentation             Cornelio Sethi MD, CHT, CWS    Portions of the record may have been created with voice recognition software  Occasional wrong word or \"sound alike\" substitutions may have occurred due to the inherent limitations of voice recognition software  Read the chart carefully and recognize, using context, where substitutions have occurred      "

## 2023-06-07 NOTE — LETTER
June 7, 2023    Ciera Messer  [phone]  [address], [address]    Dear Ciera Cole,     Your health is our first priority  It is time for your colorectal cancer screening  It's important, safe, and you have options  According to guidelines from the [address] [place] [address] Task Force, it is recommended that individuals between the ages of 39-70 get screened for CRC  As you fall within this age range, we strongly recommend that you get screened for this preventable and treatable cancer  There are several options for CRC screening including home based stool tests and colonoscopy  Your healthcare provider will be able to discuss the best option for you based on your medical history and preferences  To schedule a CRC screening, please contact our office at [phone] or make an appointment online through our patient portal  It is important to follow through with this recommendation, even if you do not have any symptoms  Thank you for considering this important step in your healthcare  If you have any questions or concerns, please do not hesitate to contact us       Sincerely,    Martha Rogel, DO

## 2023-06-14 ENCOUNTER — TELEPHONE (OUTPATIENT)
Dept: NEPHROLOGY | Facility: CLINIC | Age: 53
End: 2023-06-14

## 2023-06-14 NOTE — TELEPHONE ENCOUNTER
New Patient Intake Form   Patient Details   Tommie Horvath     1970     [id number]     Insurance Information   Name of [address] [place] [address]     Does the patient need an insurance referral? no   If patient has [employer], please ask if they will be using their [employer]  Appointment Information   Who is calling to schedule? If not patient, what is callers name? Patient    Referring Provider  Dr Henry Oliveros    Reason for Appt (Diagnosis) I10 (ICD-10-CM) - Primary hypertension   Does Patient have labs/urine done at [facility]? If not, where do they go? List the date of last lab / urine  *Please try to get labs 2 years back if not at  yes   Has patient been hospitalized recently? If yes, list name and location of hospital they were in no   Has patient been seen by a Nephrologist before? If yes, list name, location and phone number -   Has the patient had renal imaging done? If so, list the most recent date and type of imaging no    Does patient have a history of Kidney Stones? -   Appointment Details   Is there a referral on file?  yes    Appointment Date 10/26/23    Location  [place]   Miscellaneous

## 2023-06-15 ENCOUNTER — OFFICE VISIT (OUTPATIENT)
Dept: WOUND CARE | Facility: CLINIC | Age: 53
End: 2023-06-15
Payer: COMMERCIAL

## 2023-06-15 VITALS
DIASTOLIC BLOOD PRESSURE: 100 MMHG | RESPIRATION RATE: 18 BRPM | SYSTOLIC BLOOD PRESSURE: 184 MMHG | TEMPERATURE: 97 F | HEART RATE: 80 BPM

## 2023-06-15 DIAGNOSIS — S41.102A OPEN WOUND OF LEFT UPPER EXTREMITY, INITIAL ENCOUNTER: Primary | ICD-10-CM

## 2023-06-15 PROCEDURE — 97597 DBRDMT OPN WND 1ST 20 CM/<: CPT | Performed by: FAMILY MEDICINE

## 2023-06-15 PROCEDURE — 97598 DBRDMT OPN WND ADDL 20CM/<: CPT | Performed by: FAMILY MEDICINE

## 2023-06-15 RX ORDER — LIDOCAINE 40 MG/G
CREAM TOPICAL ONCE
Status: COMPLETED | OUTPATIENT
Start: 2023-06-15 | End: 2023-06-15

## 2023-06-15 RX ADMIN — LIDOCAINE: 40 CREAM TOPICAL at 16:17

## 2023-06-15 NOTE — PATIENT INSTRUCTIONS
Orders Placed This Encounter   Procedures    Wound cleansing and dressings     Shower on dressing change days   Cleanse wound with mild soap and water (recommend dove unscented) and rinse well with clear water  Pat dry with gauze before applying a new dressing    Left arm wound:        Apply mupirocin   Cover with mepilex bordered foam until you run out of that, then cover with bordered gauze that we will order for you  You can purchase the bordered foam ([employer] Mepilex 4 x 4 inch size at [facility] or on line if you prefer)  Change dressing 3 times a week   Consider wearing your padded sleeve over Left arm to protect it while wound continues to heal    Please eat 3-4 servings protein foods every day    Reduce smoking as much as you can with a focus toward eventually quitting      Follow up in 2 weeks Dr Nayana Tidwell (if wound is completely healed and has no drainage, you may cancel appointment)    Today's wound treatment note:  Cleansed with normal saline and redressed as ordered above     Standing Status:   Future     Standing Expiration Date:   6/15/2024

## 2023-06-15 NOTE — PROGRESS NOTES
Patient ID: Guido Koyanagi is a 47 y o  male Date of Birth 1970       Chief Complaint   Patient presents with   • Follow Up Wound Care Visit     Left posterior arm wound       Allergies:  Patient has no known allergies  Diagnosis:      Diagnosis ICD-10-CM Associated Orders   1  Open wound of left upper extremity, initial encounter  S41 102A lidocaine (LMX) 4 % cream     Wound cleansing and dressings     Debridement              Assessment & Plan:  Open wound site of the old scar tissue  Significant improvement  No pain  Selective debridement  Continue with mupirocin ointment  Bordered gauze  Change every 1 to 2 days  Follow-up in 2 weeks unless closed  Subjective:   6/7/2023: This is a 79-year-old male referred to the wound center because of a nonhealing wound of the left forearm  I last saw this patient in the wound center in 2015  At that time 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, 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  At that time he was in shelter  The patient had an injury to his left forearm many years ago  He had scar tissue in this area and he states from time to time with minor trauma it opens  This time, his dog hit his arm and he developed what sounds like a hematoma  This eventually disappeared but it opened and has been open to some degree for the past 7 months  He recently saw his primary care physician who prescribed mupirocin ointment  He states that since using that in the last week, it has significantly improved  The patient has type 2 diabetes and had been out of medication until March of this year  He was restarted at that time and he states that his blood sugars are ranging in the 160s  In December, when he lived in [place], his A1c was approximately 7 2     6/15/2023: Follow-up open wound of the left forearm    Patient noted significant improvement in pain and less drainage since using his "mupirocin and bordered foam   No other complaints        The following portions of the patient's history were reviewed and updated as appropriate:   Patient Active Problem List   Diagnosis   • Open wound of left upper extremity   • Diabetes mellitus type 2, insulin dependent ([facility] )     Past Medical History:   Diagnosis Date   • Diabetes 1 5, managed as type 2 ([facility] )    • Hyperthyroidism      Past Surgical History:   Procedure Laterality Date   • ABOVE ELBOW ARM AMPUTATION Right      Family History   Problem Relation Age of Onset   • Diabetes Mother    • Hypertension Mother    • Thyroid disease Sister    • Hypertension Sister       Social History     Socioeconomic History   • Marital status: /Civil Union     Spouse name: None   • Number of children: None   • Years of education: None   • Highest education level: None   Occupational History   • None   Tobacco Use   • Smoking status: Every Day     Packs/day: 0 50     Types: Cigarettes   • Smokeless tobacco: Never   Vaping Use   • Vaping Use: Every day   Substance and Sexual Activity   • Alcohol use: Not Currently   • Drug use: Not Currently   • Sexual activity: None   Other Topics Concern   • None   Social History Narrative   • None     Social Determinants of Health     Financial Resource Strain: Not on file   Food Insecurity: Not on file   Transportation Needs: Not on file   Physical Activity: Not on file   Stress: Not on file   Social Connections: Not on file   Intimate Partner Violence: Not on file   Housing Stability: Not on file        Current Outpatient Medications:   •  atorvastatin (LIPITOR) 20 mg tablet, Take 1 tablet (20 mg total) by mouth daily, Disp: 90 tablet, Rfl: 3  •  BD Insulin Syringe U/F 31G X 5/16\" 1 ML MISC, 2 (two) times a day, Disp: , Rfl:   •  buprenorphine-naloxone (Suboxone) 8-2 mg, Place 3 Film under the tongue in the morning, Disp: , Rfl:   •  buPROPion (WELLBUTRIN XL) 150 mg 24 hr tablet, Take 2 tablets by mouth in the morning (Patient " not taking: Reported on 6/7/2023), Disp: , Rfl:   •  carvedilol (COREG) 6 25 mg tablet, Take 1 tablet by mouth 2 (two) times a day, Disp: , Rfl:   •  cloNIDine (CATAPRES) 0 1 mg tablet, Take 1 tablet by mouth in the morning, Disp: , Rfl:   •  furosemide (LASIX) 20 mg tablet, Take 1 tablet (20 mg total) by mouth daily TPN, Disp: 90 tablet, Rfl: 3  •  gabapentin (NEURONTIN) 300 mg capsule, Take 1 capsule (300 mg total) by mouth 2 (two) times a day, Disp: 180 capsule, Rfl: 3  •  glipiZIDE (GLUCOTROL) 10 mg tablet, Take 1 tablet by mouth 2 (two) times a day, Disp: , Rfl:   •  HumuLIN 70/30 (70-30) 100 UNIT/ML subcutaneous injection, 20 Units 2 (two) times a day before meals, Disp: , Rfl:   •  hydrALAZINE (APRESOLINE) 25 mg tablet, Take 25 mg by mouth 3 (three) times a day, Disp: , Rfl:   •  levothyroxine 125 mcg tablet, Take 125 mcg by mouth daily, Disp: , Rfl:   •  lisinopril-hydrochlorothiazide (PRINZIDE,ZESTORETIC) 20-25 MG per tablet, Take 1 tablet by mouth daily, Disp: , Rfl:   •  metFORMIN (GLUCOPHAGE) 1000 MG tablet, Take 1 tablet by mouth 2 (two) times a day, Disp: , Rfl:   •  metFORMIN (GLUCOPHAGE) 500 mg tablet, Take 1 tablet by mouth 2 (two) times a day (Patient not taking: Reported on 5/30/2023), Disp: , Rfl:   •  mirtazapine (REMERON) 15 mg tablet, Take 1 tablet by mouth in the morning, Disp: , Rfl:   •  mupirocin (BACTROBAN) 2 % ointment, apply to wound as directed, Disp: 22 g, Rfl: 3  •  naloxone (NARCAN) 4 mg/0 1 mL nasal spray, 1 spray into each nostril once as needed, Disp: , Rfl:   •  ondansetron (ZOFRAN-ODT) 4 mg disintegrating tablet, Take 1 tablet by mouth every 8 (eight) hours as needed (Patient not taking: Reported on 6/7/2023), Disp: , Rfl:   •  tamsulosin (FLOMAX) 0 4 mg, Take 1 capsule by mouth in the morning, Disp: , Rfl:   No current facility-administered medications for this visit  Review of Systems   Constitutional: Negative for chills, fatigue and fever     HENT: Negative for congestion, hearing loss and postnasal drip  Respiratory: Negative for cough and shortness of breath  Cardiovascular: Negative for leg swelling  Musculoskeletal: Negative for gait problem  Skin: Positive for wound (Left forearm)  Negative for rash  Neurological: Negative for numbness  Hematological: Does not bruise/bleed easily  Psychiatric/Behavioral: Negative  Objective:  BP (!) 184/100 Comment: patient doctoring for HTN and is awaiting nephrology appt in October  Pulse 80   Temp (!) 97 °F (36 1 °C)   Resp 18   Pain Score: 0-No pain     Physical Exam  Vitals and nursing note reviewed  Constitutional:       Appearance: Normal appearance  He is well-developed and normal weight  HENT:      Head: Normocephalic and atraumatic  Cardiovascular:      Rate and Rhythm: Normal rate  Pulmonary:      Effort: Pulmonary effort is normal    Musculoskeletal:        Arms:       Comments: Amputation right upper extremity   Skin:     General: Skin is warm and dry  Findings: Wound present  No erythema  Comments: Open wound in the midst of chronic scar tissue  Only few areas remaining with necrotic tissue  Slough  Eschar  Overall improved  Neurological:      Mental Status: He is alert and oriented to person, place, and time  Psychiatric:         Attention and Perception: Attention normal          Mood and Affect: Mood and affect normal          Behavior: Behavior is cooperative  Cognition and Memory: Cognition normal              Wound 06/07/23 Other (comment) Arm Left;Posterior; Lower (Active)   Wound Image Images linked 06/15/23 1526   Wound Description Epithelialization;Yellow;Pink 06/15/23 1532   Heidy-wound Assessment Intact;Dry;Scaly 06/15/23 1532   Wound Length (cm) 3 2 cm 06/15/23 1532   Wound Width (cm) 2 cm 06/15/23 1532   Wound Depth (cm) 0 2 cm 06/15/23 1532   Wound Surface Area (cm^2) 6 4 cm^2 06/15/23 1532   Wound Volume (cm^3) 1 28 cm^3 06/15/23 1532 "  Calculated Wound Volume (cm^3) 1 28 cm^3 06/15/23 1532   Change in Wound Size % 23 35 06/15/23 1532   Drainage Amount Small 06/15/23 1532   Drainage Description [place];Yellow;Serous 06/15/23 1532       Debridement   Wound 06/07/23 Other (comment) Arm Left;Posterior; Lower    Universal Protocol:  Consent: Verbal consent obtained  Written consent obtained  Consent given by: patient  Time out: Immediately prior to procedure a \"time out\" was called to verify the correct patient, procedure, equipment, support staff and site/side marked as required  Patient understanding: patient states understanding of the procedure being performed  Patient identity confirmed: verbally with patient      Performed by: physician  Debridement type: selective  Pain control: lidocaine 4%  Post-debridement measurements  Length (cm): 3 2  Width (cm): 2  Depth (cm): 0 2  Percent debrided: 50%  Surface Area (cm^2): 6 4  Area debrided (cm^2): 3 2  Volume (cm^3): 1 28  Devitalized tissue debrided: fibrin, slough and eschar  Instrument(s) utilized: curette  Bleeding: medium  Hemostasis obtained with: pressure  Procedural pain (0-10): 0  Post-procedural pain: 0   Response to treatment: procedure was tolerated well                 No results found for: \"HGBA1C\"    Wound Instructions:  Orders Placed This Encounter   Procedures   • Wound cleansing and dressings     Shower on dressing change days   Cleanse wound with mild soap and water (recommend dove unscented) and rinse well with clear water     Pat dry with gauze before applying a new dressing    Left arm wound:        Apply mupirocin   Cover with mepilex bordered foam until you run out of that, then cover with bordered gauze that we will order for you  You can purchase the bordered foam ([employer] Mepilex 4 x 4 inch size at [facility] or on line if you prefer)  Change dressing 3 times a week   Consider wearing your padded sleeve over Left arm to protect it while wound continues to heal    Please eat 3-4 " "servings protein foods every day    Reduce smoking as much as you can with a focus toward eventually quitting  Follow up in 2 weeks Dr Amalia Eller (if wound is completely healed and has no drainage, you may cancel appointment)    Today's wound treatment note:  Cleansed with normal saline and redressed as ordered above     Standing Status:   Future     Standing Expiration Date:   6/15/2024   • Debridement     This order was created via procedure documentation             Sharan Hargrove MD, CHT, CWS    Portions of the record may have been created with voice recognition software  Occasional wrong word or \"sound alike\" substitutions may have occurred due to the inherent limitations of voice recognition software  Read the chart carefully and recognize, using context, where substitutions have occurred      "

## 2023-06-27 DIAGNOSIS — I10 PRIMARY HYPERTENSION: ICD-10-CM

## 2023-06-27 DIAGNOSIS — E03.9 HYPOTHYROIDISM, UNSPECIFIED TYPE: ICD-10-CM

## 2023-06-27 DIAGNOSIS — Z79.4 DIABETES MELLITUS TYPE 2, INSULIN DEPENDENT (HCC): Primary | ICD-10-CM

## 2023-06-27 DIAGNOSIS — E11.9 DIABETES MELLITUS TYPE 2, INSULIN DEPENDENT (HCC): Primary | ICD-10-CM

## 2023-06-27 RX ORDER — BLOOD SUGAR DIAGNOSTIC
STRIP MISCELLANEOUS
Qty: 360 EACH | Refills: 1 | Status: SHIPPED | OUTPATIENT
Start: 2023-06-27

## 2023-06-27 RX ORDER — LEVOTHYROXINE SODIUM 0.12 MG/1
125 TABLET ORAL DAILY
Qty: 90 TABLET | Refills: 1 | Status: SHIPPED | OUTPATIENT
Start: 2023-06-27 | End: 2023-12-24

## 2023-06-27 RX ORDER — CARVEDILOL 12.5 MG/1
12.5 TABLET ORAL 2 TIMES DAILY
Qty: 60 TABLET | Refills: 5 | Status: SHIPPED | OUTPATIENT
Start: 2023-06-27 | End: 2023-12-24

## 2023-06-27 RX ORDER — GLIPIZIDE 10 MG/1
10 TABLET ORAL 2 TIMES DAILY
Qty: 180 TABLET | Refills: 1 | Status: SHIPPED | OUTPATIENT
Start: 2023-06-27 | End: 2023-12-24

## 2023-06-27 RX ORDER — HYDRALAZINE HYDROCHLORIDE 25 MG/1
25 TABLET, FILM COATED ORAL 3 TIMES DAILY
Qty: 90 TABLET | Refills: 2 | Status: SHIPPED | OUTPATIENT
Start: 2023-06-27 | End: 2023-09-25

## 2023-06-27 RX ORDER — INSULIN HUMAN 100 [IU]/ML
18 INJECTION, SUSPENSION SUBCUTANEOUS
Qty: 32.4 ML | Refills: 1 | Status: SHIPPED | OUTPATIENT
Start: 2023-06-27 | End: 2023-12-24

## 2023-06-27 RX ORDER — LISINOPRIL 40 MG/1
40 TABLET ORAL DAILY
Qty: 90 TABLET | Refills: 1 | Status: SHIPPED | OUTPATIENT
Start: 2023-06-27 | End: 2023-12-24

## 2023-07-10 ENCOUNTER — OFFICE VISIT (OUTPATIENT)
Dept: WOUND CARE | Facility: CLINIC | Age: 53
End: 2023-07-10
Payer: COMMERCIAL

## 2023-07-10 ENCOUNTER — HOSPITAL ENCOUNTER (OUTPATIENT)
Dept: RADIOLOGY | Facility: HOSPITAL | Age: 53
Discharge: HOME | End: 2023-07-10
Payer: COMMERCIAL

## 2023-07-10 DIAGNOSIS — S41.102A OPEN WOUND OF LEFT UPPER EXTREMITY, INITIAL ENCOUNTER: ICD-10-CM

## 2023-07-10 DIAGNOSIS — S41.102A OPEN WOUND OF LEFT UPPER EXTREMITY, INITIAL ENCOUNTER: Primary | ICD-10-CM

## 2023-07-10 PROCEDURE — 11042 DBRDMT SUBQ TIS 1ST 20SQCM/<: CPT | Performed by: FAMILY MEDICINE

## 2023-07-10 PROCEDURE — 73090 X-RAY EXAM OF FOREARM: CPT

## 2023-07-10 PROCEDURE — 99214 OFFICE O/P EST MOD 30 MIN: CPT | Performed by: FAMILY MEDICINE

## 2023-07-10 RX ORDER — LIDOCAINE 40 MG/G
CREAM TOPICAL ONCE
Status: COMPLETED | OUTPATIENT
Start: 2023-07-10 | End: 2023-07-10

## 2023-07-10 RX ADMIN — LIDOCAINE: 40 CREAM TOPICAL at 14:32

## 2023-07-10 NOTE — PATIENT INSTRUCTIONS
Orders Placed This Encounter   Procedures    Debridement     This order was created via procedure documentation    Wound cleansing and dressings         Shower on dressing change days . Cleanse wound with mild soap and water (recommend dove unscented) and rinse well with clear water. Pat dry with gauze before applying a new dressing     Left arm wound:                                 STOP mupirocin. Cleanse woudn with Prophase wound wash.(given to patient)  Apply skin prep to the skin around the wound. DO NOT put on the wound. Cover with hydrocolloid bandage. Change dressing every four days. Consider wearing your padded sleeve over left arm to protect it while wound continues to heal     Please eat 3-4 servings protein foods every day     Reduce smoking as much as you can with a focus toward eventually quitting. X-ray ordered of left arm. Please get this done today after your wound visit. Follow up in 2 weeks Dr. Dana Pitt (if wound is completely healed and has no drainage, you may cancel appointment)     Today's wound treatment note:  Cleansed with Prophase and redressed as ordered above     Standing Status:   Future     Standing Expiration Date:   7/10/2024    XR forearm 2 vw left     Non healing wound     Standing Status:   Future     Standing Expiration Date:   7/10/2027     Scheduling Instructions:      Bring along any outside films relating to this procedure.

## 2023-07-10 NOTE — PROGRESS NOTES
Patient ID: Gregg Pulliam is a 47 y.o. male Date of Birth 1970       Chief Complaint   Patient presents with   • Follow Up Wound Care Visit     Left arm wound       Allergies:  Patient has no known allergies. Diagnosis:      Diagnosis ICD-10-CM Associated Orders   1. Open wound of left upper extremity, initial encounter  S41.102A lidocaine (LMX) 4 % cream     XR forearm 2 vw left     Debridement     Wound cleansing and dressings              Assessment & Plan:  Nonhealing wound of the left forearm and previous injury from the 1990s. Extensive scar tissue. Also has a history of metastatic squamous cell carcinoma that had been treated many years ago. I explained that I am concerned and possibly should do a biopsy for possible malignancy and x-ray to look for underlying bony pathology. The patient was adamant about not having any biopsy and "if I have any recurrent cancer, so be it". He did however agree to x-ray. Trial of hydrocolloid every 4 days. Follow-up in 2 weeks. Subjective:   6/7/2023: This is a 62-year-old male referred to the wound center because of a nonhealing wound of the left forearm. I last saw this patient in the wound center in 2015. 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 [facility]. The patient had an injury to his left forearm many years ago. He had scar tissue in this area and he states from time to time with minor trauma it opens. This time, his dog hit his arm and he developed what sounds like a hematoma. This eventually disappeared but it opened and has been open to some degree for the past 7 months. He recently saw his primary care physician who prescribed mupirocin ointment. He states that since using that in the last week, it has significantly improved.   The patient has type 2 diabetes and had been out of medication until March of this year. He was restarted at that time and he states that his blood sugars are ranging in the 160s. In December, when he lived in [place], his A1c was approximately 7.2.    6/15/2023: Follow-up open wound of the left forearm. Patient noted significant improvement in pain and less drainage since using his mupirocin and bordered foam.  No other complaints. 7/10/2023: Follow-up chronic wound of the left forearm. He has been using mupirocin and he believes that it is improving. Still has some pain in certain areas. This open wound is in the site of previous injury in the 90s.       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[address])    • Hyperthyroidism      Past Surgical History:   Procedure Laterality Date   • ABOVE ELBOW ARM AMPUTATION Right      Family History   Problem Relation Age of Onset   • Diabetes Mother    • Hypertension Mother    • Thyroid disease Sister    • Hypertension Sister       Social History     Socioeconomic History   • Marital status: /Civil Union     Spouse name: Not on file   • Number of children: Not on file   • Years of education: Not on file   • Highest education level: Not on file   Occupational History   • Not on file   Tobacco Use   • Smoking status: Every Day     Packs/day: 0.50     Types: Cigarettes   • Smokeless tobacco: Never   Vaping Use   • Vaping Use: Every day   Substance and Sexual Activity   • Alcohol use: Not Currently   • Drug use: Not Currently   • Sexual activity: Not on file   Other Topics Concern   • Not on file   Social History Narrative   • Not on file     Social Determinants of Health     Financial Resource Strain: Not on file   Food Insecurity: Not on file   Transportation Needs: Not on file   Physical Activity: Not on file   Stress: Not on file   Social Connections: Not on file   Intimate Partner Violence: Not on file   Housing Stability: Not on file        Current Outpatient Medications:   •  atorvastatin (LIPITOR) 20 mg tablet, Take 1 tablet (20 mg total) by mouth daily, Disp: 90 tablet, Rfl: 3  •  BD Insulin Syringe U/F 31G X 5/16" 1 ML MISC, 2 (two) times a day, Disp: , Rfl:   •  buprenorphine-naloxone (Suboxone) 8-2 mg, Place 3 Film under the tongue in the morning, Disp: , Rfl:   •  buPROPion (WELLBUTRIN XL) 150 mg 24 hr tablet, Take 2 tablets by mouth in the morning (Patient not taking: Reported on 6/7/2023), Disp: , Rfl:   •  carvedilol (COREG) 12.5 mg tablet, Take 1 tablet (12.5 mg total) by mouth 2 (two) times a day, Disp: 60 tablet, Rfl: 5  •  cloNIDine (CATAPRES) 0.1 mg tablet, Take 1 tablet by mouth in the morning, Disp: , Rfl:   •  furosemide (LASIX) 20 mg tablet, Take 1 tablet (20 mg total) by mouth daily TPN, Disp: 90 tablet, Rfl: 3  •  gabapentin (NEURONTIN) 300 mg capsule, Take 1 capsule (300 mg total) by mouth 2 (two) times a day, Disp: 180 capsule, Rfl: 3  •  glipiZIDE (GLUCOTROL) 10 mg tablet, Take 1 tablet (10 mg total) by mouth 2 (two) times a day, Disp: 180 tablet, Rfl: 1  •  glucose blood (OneTouch Verio) test strip, Test up to four times daily. , Disp: 360 each, Rfl: 1  •  HumuLIN 70/30 (70-30) 100 UNIT/ML subcutaneous injection, Inject 18 Units under the skin 2 (two) times a day before meals, Disp: 32.4 mL, Rfl: 1  •  hydrALAZINE (APRESOLINE) 25 mg tablet, Take 1 tablet (25 mg total) by mouth 3 (three) times a day, Disp: 90 tablet, Rfl: 2  •  levothyroxine 125 mcg tablet, Take 1 tablet (125 mcg total) by mouth daily, Disp: 90 tablet, Rfl: 1  •  lisinopril (ZESTRIL) 40 mg tablet, Take 1 tablet (40 mg total) by mouth daily, Disp: 90 tablet, Rfl: 1  •  metFORMIN (GLUCOPHAGE) 1000 MG tablet, Take 1 tablet (1,000 mg total) by mouth 2 (two) times a day, Disp: 180 tablet, Rfl: 1  •  metFORMIN (GLUCOPHAGE) 500 mg tablet, Take 1 tablet by mouth 2 (two) times a day (Patient not taking: Reported on 5/30/2023), Disp: , Rfl:   •  mirtazapine (REMERON) 15 mg tablet, Take 1 tablet by mouth in the morning, Disp: , Rfl:   •  mupirocin (BACTROBAN) 2 % ointment, apply to wound as directed, Disp: 22 g, Rfl: 3  •  naloxone (NARCAN) 4 mg/0.1 mL nasal spray, 1 spray into each nostril once as needed, Disp: , Rfl:   •  ondansetron (ZOFRAN-ODT) 4 mg disintegrating tablet, Take 1 tablet by mouth every 8 (eight) hours as needed (Patient not taking: Reported on 6/7/2023), Disp: , Rfl:   •  tamsulosin (FLOMAX) 0.4 mg, Take 1 capsule by mouth in the morning, Disp: , Rfl:   No current facility-administered medications for this visit. Review of Systems   Constitutional: Negative for chills, fatigue and fever. HENT: Negative for congestion, hearing loss and postnasal drip. Respiratory: Negative for cough and shortness of breath. Cardiovascular: Negative for leg swelling. Musculoskeletal: Negative for gait problem. Skin: Positive for wound (Left forearm). Negative for rash. Neurological: Negative for numbness. Hematological: Does not bruise/bleed easily. Psychiatric/Behavioral: Negative. Objective: There were no vitals taken for this visit. Physical Exam  Vitals and nursing note reviewed. Constitutional:       Appearance: Normal appearance. He is well-developed and normal weight. HENT:      Head: Normocephalic and atraumatic. Cardiovascular:      Rate and Rhythm: Normal rate. Pulmonary:      Effort: Pulmonary effort is normal.   Musculoskeletal:        Arms:       Comments: Amputation right upper extremity   Skin:     General: Skin is warm and dry. Findings: Wound present. No erythema. Comments: Open wound in the midst of chronic scar tissue. Few areas with slough and eschar. No exposed bone. Tender to palpation without any erythema. No malodor. Neurological:      Mental Status: He is alert and oriented to person, place, and time. Psychiatric:         Attention and Perception: Attention normal.         Mood and Affect: Mood and affect normal.         Behavior: Behavior is cooperative. Cognition and Memory: Cognition normal.             Wound 06/07/23 Other (comment) Arm Left;Posterior; Lower (Active)   Wound Image Images linked 07/10/23 1422   Wound Description Epithelialization;Yellow;Pink 07/10/23 1402   Heidy-wound Assessment Scar Tissue; Intact;Dry 07/10/23 1402   Wound Length (cm) 4 cm 07/10/23 1402   Wound Width (cm) 2.2 cm 07/10/23 1402   Wound Depth (cm) 0.2 cm 07/10/23 1402   Wound Surface Area (cm^2) 8.8 cm^2 07/10/23 1402   Wound Volume (cm^3) 1.76 cm^3 07/10/23 1402   Calculated Wound Volume (cm^3) 1.76 cm^3 07/10/23 1402   Change in Wound Size % -5.39 07/10/23 1402   Drainage Amount Small 07/10/23 1402   Drainage Description Yellow 07/10/23 1402   Non-staged Wound Description Full thickness 07/10/23 1402   Treatments Irrigation with NSS 07/10/23 1402       Debridement   Wound 06/07/23 Other (comment) Arm Left;Posterior; Lower    Universal Protocol:  Consent: Verbal consent obtained. Written consent obtained. Consent given by: patient  Time out: Immediately prior to procedure a "time out" was called to verify the correct patient, procedure, equipment, support staff and site/side marked as required.   Patient understanding: patient states understanding of the procedure being performed  Patient identity confirmed: verbally with patient      Performed by: physician  Debridement type: surgical  Level of debridement: subcutaneous tissue  Pain control: lidocaine 4%  Post-debridement measurements  Length (cm): 4  Width (cm): 2.2  Depth (cm): 0.2  Percent debrided: 70%  Surface Area (cm^2): 8.8  Area debrided (cm^2): 6.16  Volume (cm^3): 1.76  Tissue and other material debrided: dermis and subcutaneous tissue  Devitalized tissue debrided: fibrin, necrotic debris, slough and eschar  Instrument(s) utilized: curette  Bleeding: medium  Hemostasis obtained with: pressure  Procedural pain (0-10): 3  Post-procedural pain: 0   Response to treatment: procedure was tolerated well                     No results found for: "HGBA1C"    Wound Instructions:  Orders Placed This Encounter   Procedures   • Debridement     This order was created via procedure documentation   • Wound cleansing and dressings         Shower on dressing change days . Cleanse wound with mild soap and water (recommend dove unscented) and rinse well with clear water. Pat dry with gauze before applying a new dressing     Left arm wound:                                 STOP mupirocin. Cleanse woudn with Prophase wound wash.(given to patient)  Apply skin prep to the skin around the wound. DO NOT put on the wound. Cover with hydrocolloid bandage. Change dressing every four days. Consider wearing your padded sleeve over left arm to protect it while wound continues to heal     Please eat 3-4 servings protein foods every day     Reduce smoking as much as you can with a focus toward eventually quitting. X-ray ordered of left arm. Please get this done today after your wound visit.        Follow up in 2 weeks Dr. Cinthia Miller (if wound is completely healed and has no drainage, you may cancel appointment)     Today's wound treatment note:  Cleansed with Prophase and redressed as ordered above     Standing Status:   Future     Standing Expiration Date:   7/10/2024   • XR forearm 2 vw left     Non healing wound     Standing Status:   Future     Standing Expiration Date:   7/10/2027     Scheduling Instructions:      Bring along any outside films relating to this procedure. Colleen Riddle MD, CHT, CWS    Portions of the record may have been created with voice recognition software.  Occasional wrong word or "sound alike" substitutions may have occurred due to the inherent limitations of voice recognition software. Read the chart carefully and recognize, using context, where substitutions have occurred.

## 2023-07-11 ENCOUNTER — TELEPHONE (OUTPATIENT)
Dept: FAMILY MEDICINE CLINIC | Facility: CLINIC | Age: 53
End: 2023-07-11

## 2023-07-17 ENCOUNTER — OFFICE VISIT (OUTPATIENT)
Dept: WOUND CARE | Facility: CLINIC | Age: 53
End: 2023-07-17
Payer: COMMERCIAL

## 2023-07-17 VITALS
SYSTOLIC BLOOD PRESSURE: 158 MMHG | TEMPERATURE: 97.9 F | RESPIRATION RATE: 16 BRPM | DIASTOLIC BLOOD PRESSURE: 80 MMHG | HEART RATE: 92 BPM

## 2023-07-17 DIAGNOSIS — Z79.4 DIABETES MELLITUS TYPE 2, INSULIN DEPENDENT (HCC): ICD-10-CM

## 2023-07-17 DIAGNOSIS — S41.102A OPEN WOUND OF LEFT UPPER EXTREMITY, INITIAL ENCOUNTER: Primary | ICD-10-CM

## 2023-07-17 DIAGNOSIS — E11.9 DIABETES MELLITUS TYPE 2, INSULIN DEPENDENT (HCC): ICD-10-CM

## 2023-07-17 PROCEDURE — 99213 OFFICE O/P EST LOW 20 MIN: CPT | Performed by: FAMILY MEDICINE

## 2023-07-17 PROCEDURE — 99214 OFFICE O/P EST MOD 30 MIN: CPT | Performed by: FAMILY MEDICINE

## 2023-07-17 RX ORDER — LIDOCAINE 40 MG/G
CREAM TOPICAL ONCE
Status: COMPLETED | OUTPATIENT
Start: 2023-07-17 | End: 2023-07-17

## 2023-07-17 RX ADMIN — LIDOCAINE: 40 CREAM TOPICAL at 14:49

## 2023-07-17 NOTE — PROGRESS NOTES
Patient ID: Lazarus Sánchez is a 47 y.o. male Date of Birth 1970       Chief Complaint   Patient presents with   • Follow Up Wound Care Visit     Left upper extremity wound. Allergies:  Patient has no known allergies. Diagnosis:      Diagnosis ICD-10-CM Associated Orders   1. Open wound of left upper extremity, initial encounter  S41.102A lidocaine (LMX) 4 % cream     Wound cleansing and dressings     mupirocin (BACTROBAN) 2 % ointment     MRI forearm left wo contrast      2. Diabetes mellitus type 2, insulin dependent (HCC)  E11.9 lidocaine (LMX) 4 % cream    Z79.4 Wound cleansing and dressings              Assessment & Plan:  Chronic wound of the left forearm. Minimal improvement. Did not want to use the hydrocolloid because of the smell and the pain. Therefore, we will go back to using mupirocin and bordered gauze every other day. X-ray results:    IMPRESSION:     No acute osseous abnormality.   Small mixed lytic/blastic lesion in the distal radius of uncertain etiology. Overall the appearance is benign but further evaluation is certainly warranted given the patient's underlying history.   Old healed distal ulnar fracture. Proximal forearm soft tissue abnormality consistent with the known wound     Will order MRI for further investigation. However, it does not appear that there is any abnormality in the area of the chronic wound. Type 2 diabetes. Patient has labile blood sugars with hypoglycemia and some hyperglycemia. He does have an appointment with endocrinology for the first time but not until October.              Subjective:   6/7/2023: This is a 62-year-old male referred to the wound center because of a nonhealing wound of the left forearm. I last saw this patient in the wound center in 2015. 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   At that time he was in [facility]. The patient had an injury to his left forearm many years ago. He had scar tissue in this area and he states from time to time with minor trauma it opens. This time, his dog hit his arm and he developed what sounds like a hematoma. This eventually disappeared but it opened and has been open to some degree for the past 7 months. He recently saw his primary care physician who prescribed mupirocin ointment. He states that since using that in the last week, it has significantly improved. The patient has type 2 diabetes and had been out of medication until March of this year. He was restarted at that time and he states that his blood sugars are ranging in the 160s. In December, when he lived in [place], his A1c was approximately 7.2.    6/15/2023: Follow-up open wound of the left forearm. Patient noted significant improvement in pain and less drainage since using his mupirocin and bordered foam.  No other complaints. 7/10/2023: Follow-up chronic wound of the left forearm. He has been using mupirocin and he believes that it is improving. Still has some pain in certain areas. This open wound is in the site of previous injury in the 90s. 7/17/2023: Follow-up chronic wound of the left forearm. At last visit, hydrocolloid was prescribed but he did not like this and removed it. He went back to his mupirocin. Today he left it open to the air to dry out. Again he feels that it is slowly improving. X-ray was obtained at last visit.       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[address])    • Hyperthyroidism      Past Surgical History:   Procedure Laterality Date   • ABOVE ELBOW ARM AMPUTATION Right      Family History   Problem Relation Age of Onset   • Diabetes Mother    • Hypertension Mother    • Thyroid disease Sister    • Hypertension Sister       Social History     Socioeconomic History   • Marital status: /Civil Union     Spouse name: None   • Number of children: None   • Years of education: None   • Highest education level: None   Occupational History   • None   Tobacco Use   • Smoking status: Every Day     Packs/day: 0.50     Types: Cigarettes   • Smokeless tobacco: Never   Vaping Use   • Vaping Use: Every day   Substance and Sexual Activity   • Alcohol use: Not Currently   • Drug use: Not Currently   • Sexual activity: None   Other Topics Concern   • None   Social History Narrative   • None     Social Determinants of Health     Financial Resource Strain: Not on file   Food Insecurity: Not on file   Transportation Needs: Not on file   Physical Activity: Not on file   Stress: Not on file   Social Connections: Not on file   Intimate Partner Violence: Not on file   Housing Stability: Not on file        Current Outpatient Medications:   •  mupirocin (BACTROBAN) 2 % ointment, apply to wound as directed, Disp: 22 g, Rfl: 3  •  atorvastatin (LIPITOR) 20 mg tablet, Take 1 tablet (20 mg total) by mouth daily, Disp: 90 tablet, Rfl: 3  •  BD Insulin Syringe U/F 31G X 5/16" 1 ML MISC, 2 (two) times a day, Disp: , Rfl:   •  buprenorphine-naloxone (Suboxone) 8-2 mg, Place 3 Film under the tongue in the morning, Disp: , Rfl:   •  buPROPion (WELLBUTRIN XL) 150 mg 24 hr tablet, Take 2 tablets by mouth in the morning (Patient not taking: Reported on 6/7/2023), Disp: , Rfl:   •  carvedilol (COREG) 12.5 mg tablet, Take 1 tablet (12.5 mg total) by mouth 2 (two) times a day, Disp: 60 tablet, Rfl: 5  •  cloNIDine (CATAPRES) 0.1 mg tablet, Take 1 tablet by mouth in the morning, Disp: , Rfl:   •  furosemide (LASIX) 20 mg tablet, Take 1 tablet (20 mg total) by mouth daily TPN, Disp: 90 tablet, Rfl: 3  •  gabapentin (NEURONTIN) 300 mg capsule, Take 1 capsule (300 mg total) by mouth 2 (two) times a day, Disp: 180 capsule, Rfl: 3  •  glipiZIDE (GLUCOTROL) 10 mg tablet, Take 1 tablet (10 mg total) by mouth 2 (two) times a day, Disp: 180 tablet, Rfl: 1  •  glucose blood (OneTouch Verio) test strip, Test up to four times daily. , Disp: 360 each, Rfl: 1  •  HumuLIN 70/30 (70-30) 100 UNIT/ML subcutaneous injection, Inject 18 Units under the skin 2 (two) times a day before meals, Disp: 32.4 mL, Rfl: 1  •  hydrALAZINE (APRESOLINE) 25 mg tablet, Take 1 tablet (25 mg total) by mouth 3 (three) times a day, Disp: 90 tablet, Rfl: 2  •  levothyroxine 125 mcg tablet, Take 1 tablet (125 mcg total) by mouth daily, Disp: 90 tablet, Rfl: 1  •  lisinopril (ZESTRIL) 40 mg tablet, Take 1 tablet (40 mg total) by mouth daily, Disp: 90 tablet, Rfl: 1  •  metFORMIN (GLUCOPHAGE) 1000 MG tablet, Take 1 tablet (1,000 mg total) by mouth 2 (two) times a day, Disp: 180 tablet, Rfl: 1  •  metFORMIN (GLUCOPHAGE) 500 mg tablet, Take 1 tablet by mouth 2 (two) times a day (Patient not taking: Reported on 5/30/2023), Disp: , Rfl:   •  mirtazapine (REMERON) 15 mg tablet, Take 1 tablet by mouth in the morning, Disp: , Rfl:   •  naloxone (NARCAN) 4 mg/0.1 mL nasal spray, 1 spray into each nostril once as needed, Disp: , Rfl:   •  ondansetron (ZOFRAN-ODT) 4 mg disintegrating tablet, Take 1 tablet by mouth every 8 (eight) hours as needed (Patient not taking: Reported on 6/7/2023), Disp: , Rfl:   •  tamsulosin (FLOMAX) 0.4 mg, Take 1 capsule by mouth in the morning, Disp: , Rfl:   No current facility-administered medications for this visit. Review of Systems   Constitutional: Negative for chills, fatigue and fever. HENT: Negative for congestion, hearing loss and postnasal drip. Respiratory: Negative for cough and shortness of breath. Cardiovascular: Negative for leg swelling. Musculoskeletal: Negative for gait problem. Skin: Positive for wound (Left forearm). Negative for rash.    Neurological: Negative for numbness. Hematological: Does not bruise/bleed easily. Psychiatric/Behavioral: Negative. Objective:  /80   Pulse 92   Temp 97.9 °F (36.6 °C)   Resp 16   Pain Score: 0-No pain     Physical Exam  Vitals and nursing note reviewed. Constitutional:       Appearance: Normal appearance. He is well-developed and normal weight. HENT:      Head: Normocephalic and atraumatic. Cardiovascular:      Rate and Rhythm: Normal rate. Pulmonary:      Effort: Pulmonary effort is normal.   Musculoskeletal:        Arms:       Comments: Amputation right upper extremity   Skin:     General: Skin is warm and dry. Findings: Wound present. No erythema. Comments: Open wound in the midst of chronic scar tissue. Few areas with slough. No exposed bone. But there are hardened areas that are suggestive of bone. Tender to palpation without any erythema. No malodor. Neurological:      Mental Status: He is alert and oriented to person, place, and time. Psychiatric:         Attention and Perception: Attention normal.         Mood and Affect: Mood and affect normal.         Behavior: Behavior is cooperative. Cognition and Memory: Cognition normal.         Wound 06/07/23 Other (comment) Arm Left;Posterior; Lower (Active)   Wound Image   07/17/23 1411   Wound Description Yellow;Pink 07/17/23 1410   Heidy-wound Assessment Scar Tissue; Intact;Dry 07/17/23 1410   Wound Length (cm) 4.5 cm 07/17/23 1410   Wound Width (cm) 2.1 cm 07/17/23 1410   Wound Depth (cm) 0.2 cm 07/17/23 1410   Wound Surface Area (cm^2) 9.45 cm^2 07/17/23 1410   Wound Volume (cm^3) 1.89 cm^3 07/17/23 1410   Calculated Wound Volume (cm^3) 1.89 cm^3 07/17/23 1410   Change in Wound Size % -13.17 07/17/23 1410   Drainage Amount MEY 07/17/23 1410   Drainage Description Yellow 07/10/23 1402   Non-staged Wound Description Full thickness 07/17/23 1410   Treatments Irrigation with NSS 07/17/23 1410   Dressing Status Other (Comment) 06/07/23 2177                                      No results found for: "HGBA1C"    Wound Instructions:  Orders Placed This Encounter   Procedures   • Wound cleansing and dressings     • Wound cleansing and dressings                               Shower on dressing change days . Cleanse wound with mild soap and water (recommend dove unscented) and rinse well with clear water. Pat dry with gauze before applying a new dressing     Left arm wound:                                 Apply mupirocin to wound bed  Cover with border foam  Change dressing every other day. Consider wearing your padded sleeve over left arm to protect it while wound continues to heal     Please eat 3-4 servings protein foods every day     Reduce smoking as much as you can with a focus toward eventually quitting.      Follow up in 2 weeks Dr. Zarina Dutta   Today's wound treatment note:  Cleansed with NSS and redressed as ordered above     Standing Status:   Future     Standing Expiration Date:   7/17/2024   • MRI forearm left wo contrast     Standing Status:   Future     Standing Expiration Date:   7/17/2027     Scheduling Instructions: There is no preparation for this test. Please leave your jewelry and valuables at home, wedding rings are the exception. All patients will be required to change into a hospital gown and pants. Street clothes are not permitted in the MRI. Magnetic nail polish must be removed prior to arrival for your test. Please bring your insurance cards, a form of photo ID and a list of your medications with you. Arrive 15 minutes prior to your appointment time in order to register. Please bring any prior CT or MRI studies of this area that were not performed at a [facility] facility. To schedule this appointment, please contact Central Scheduling at [phone]. Prior to your appointment, please make sure you complete the MRI Screening Form when you e-Check in for your appointment.  This will be available starting 7 days before your appointment in [address] [place] [address]. You may receive an e-mail with an activation code if you do not have a [employer] account. If you do not have access to a device, we will complete your screening at your appointment. Order Specific Question:   What is the patient's sedation requirement? If Medication for Claustrophobia is selected, order medication at this point. Answer:   No Sedation     Order Specific Question:   Release to patient through [employer]     Answer:   Immediate     Order Specific Question:   Is order priority selected as STAT? Answer:   No     Order Specific Question:   Reason for Exam (FREE TEXT)     Answer:   lesion noted on x-ray distal radius     Order Specific Question:   When should the test be performed? Answer:   Elective- non urgent             Anum Soto MD, CHT, CWS    Portions of the record may have been created with voice recognition software. Occasional wrong word or "sound alike" substitutions may have occurred due to the inherent limitations of voice recognition software. Read the chart carefully and recognize, using context, where substitutions have occurred.

## 2023-07-17 NOTE — PATIENT INSTRUCTIONS
Orders Placed This Encounter   Procedures    Wound cleansing and dressings      Wound cleansing and dressings                               Shower on dressing change days . Cleanse wound with mild soap and water (recommend dove unscented) and rinse well with clear water. Pat dry with gauze before applying a new dressing     Left arm wound:                                 Apply mupirocin to wound bed  Cover with border foam  Change dressing every other day. Consider wearing your padded sleeve over left arm to protect it while wound continues to heal     Please eat 3-4 servings protein foods every day     Reduce smoking as much as you can with a focus toward eventually quitting.       Follow up in 2 weeks Dr. Rosa Isela Chang wound treatment note:  Cleansed with NSS and redressed as ordered above     Standing Status:   Future     Standing Expiration Date:   7/17/2024

## 2023-07-24 ENCOUNTER — HOSPITAL ENCOUNTER (OUTPATIENT)
Dept: MRI IMAGING | Facility: HOSPITAL | Age: 53
Discharge: HOME | End: 2023-07-24
Payer: COMMERCIAL

## 2023-07-24 DIAGNOSIS — S41.102A OPEN WOUND OF LEFT UPPER EXTREMITY, INITIAL ENCOUNTER: ICD-10-CM

## 2023-07-24 PROCEDURE — G1004 CDSM NDSC: HCPCS

## 2023-07-24 PROCEDURE — 73218 MRI UPPER EXTREMITY W/O DYE: CPT

## 2023-07-31 ENCOUNTER — OFFICE VISIT (OUTPATIENT)
Dept: WOUND CARE | Facility: CLINIC | Age: 53
End: 2023-07-31
Payer: COMMERCIAL

## 2023-07-31 VITALS
TEMPERATURE: 98.8 F | HEART RATE: 80 BPM | RESPIRATION RATE: 16 BRPM | SYSTOLIC BLOOD PRESSURE: 152 MMHG | DIASTOLIC BLOOD PRESSURE: 90 MMHG

## 2023-07-31 DIAGNOSIS — S41.102A OPEN WOUND OF LEFT UPPER EXTREMITY, INITIAL ENCOUNTER: Primary | ICD-10-CM

## 2023-07-31 PROCEDURE — 99213 OFFICE O/P EST LOW 20 MIN: CPT | Performed by: FAMILY MEDICINE

## 2023-07-31 RX ORDER — LIDOCAINE 40 MG/G
CREAM TOPICAL ONCE
Status: COMPLETED | OUTPATIENT
Start: 2023-07-31 | End: 2023-07-31

## 2023-07-31 RX ADMIN — LIDOCAINE: 40 CREAM TOPICAL at 13:49

## 2023-07-31 NOTE — PROGRESS NOTES
Patient ID: Shola Torres is a 47 y.o. male Date of Birth 1970       Chief Complaint   Patient presents with   • Follow Up Wound Care Visit     Left arm wound       Allergies:  Patient has no known allergies. Diagnosis:      Diagnosis ICD-10-CM Associated Orders   1. Open wound of left upper extremity, initial encounter  S41.102A lidocaine (LMX) 4 % cream     Wound cleansing and dressings              Assessment & Plan:  Chronic recurring wound of the left forearm. No significant change. 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     Assuming the patient has no associated pain, follow-up left forearm radiographs could be obtained in 6-12 months to document stability. DC mupirocin ointment. Start Aquaphor or CeraVe healing ointment every other day. Any signs of inflammation or infection restart mupirocin. Follow-up in approximately 3 weeks. Palliative care. The patient has chosen a palliative care plan. Based on this plan, there is no expectation of healing. The end goals of our palliative care plan are pain control, drainage management, prevention of infection, odor control, and optimization of quality of life insofar as the wound will allow. Therefore, invasive procedures related to the wound will be minimized. Dressings will be chosen to achieve the palliative care plan goals rather that to achieve healing of the wound. Debridements may be performed periodically in order to prevent infection or control odor, etc.  There was a clear discussion in the treatment room regarding the palliative (versus active) care plan and the goals. Healing was not discussed as a goal.  I was clear that a non-palliative or active care plan can be instituted at any time based on the wishes of the patient.   Visits will be scheduled accordingly to minimize disruption of quality of life while allowing appropriate physician oversight of the wound and any dramatic changes that might occur. Subjective:   6/7/2023: This is a 65-year-old male referred to the wound center because of a nonhealing wound of the left forearm. I last saw this patient in the wound center in 2015. 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 At that time he was in care home. The patient had an injury to his left forearm many years ago. He had scar tissue in this area and he states from time to time with minor trauma it opens. This time, his dog hit his arm and he developed what sounds like a hematoma. This eventually disappeared but it opened and has been open to some degree for the past 7 months. He recently saw his primary care physician who prescribed mupirocin ointment. He states that since using that in the last week, it has significantly improved. The patient has type 2 diabetes and had been out of medication until March of this year. He was restarted at that time and he states that his blood sugars are ranging in the 160s. In December, when he lived in [place], his A1c was approximately 7.2.    6/15/2023: Follow-up open wound of the left forearm. Patient noted significant improvement in pain and less drainage since using his mupirocin and bordered foam.  No other complaints. 7/10/2023: Follow-up chronic wound of the left forearm. He has been using mupirocin and he believes that it is improving. Still has some pain in certain areas. This open wound is in the site of previous injury in the 90s. 7/17/2023: Follow-up chronic wound of the left forearm. At last visit, hydrocolloid was prescribed but he did not like this and removed it. He went back to his mupirocin. Today he left it open to the air to dry out. Again he feels that it is slowly improving. X-ray was obtained at last visit. 7/31/23: Follow-up chronic wound of left forearm.   He has been using mupirocin. No changes.   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     Assuming the patient has no associated pain, follow-up left forearm radiographs could be obtained in 6-12 months to document stability.         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[address])    • Hyperthyroidism      Past Surgical History:   Procedure Laterality Date   • ABOVE ELBOW ARM AMPUTATION Right      Family History   Problem Relation Age of Onset   • Diabetes Mother    • Hypertension Mother    • Thyroid disease Sister    • Hypertension Sister       Social History     Socioeconomic History   • Marital status: /Civil Union     Spouse name: Not on file   • Number of children: Not on file   • Years of education: Not on file   • Highest education level: Not on file   Occupational History   • Not on file   Tobacco Use   • Smoking status: Every Day     Packs/day: 0.50     Types: Cigarettes   • Smokeless tobacco: Never   Vaping Use   • Vaping Use: Every day   Substance and Sexual Activity   • Alcohol use: Not Currently   • Drug use: Not Currently   • Sexual activity: Not on file   Other Topics Concern   • Not on file   Social History Narrative   • Not on file     Social Determinants of Health     Financial Resource Strain: Not on file   Food Insecurity: Not on file   Transportation Needs: Not on file   Physical Activity: Not on file   Stress: Not on file   Social Connections: Not on file   Intimate Partner Violence: Not on file   Housing Stability: Not on file        Current Outpatient Medications:   •  atorvastatin (LIPITOR) 20 mg tablet, Take 1 tablet (20 mg total) by mouth daily, Disp: 90 tablet, Rfl: 3  •  BD Insulin Syringe U/F 31G X 5/16" 1 ML MISC, 2 (two) times a day, Disp: , Rfl:   •  buprenorphine-naloxone (Suboxone) 8-2 mg, Place 3 Film under the tongue in the morning, Disp: , Rfl:   •  buPROPion (WELLBUTRIN XL) 150 mg 24 hr tablet, Take 2 tablets by mouth in the morning (Patient not taking: Reported on 6/7/2023), Disp: , Rfl:   •  carvedilol (COREG) 12.5 mg tablet, Take 1 tablet (12.5 mg total) by mouth 2 (two) times a day, Disp: 60 tablet, Rfl: 5  •  cloNIDine (CATAPRES) 0.1 mg tablet, Take 1 tablet by mouth in the morning, Disp: , Rfl:   •  furosemide (LASIX) 20 mg tablet, Take 1 tablet (20 mg total) by mouth daily TPN, Disp: 90 tablet, Rfl: 3  •  gabapentin (NEURONTIN) 300 mg capsule, Take 1 capsule (300 mg total) by mouth 2 (two) times a day, Disp: 180 capsule, Rfl: 3  •  glipiZIDE (GLUCOTROL) 10 mg tablet, Take 1 tablet (10 mg total) by mouth 2 (two) times a day, Disp: 180 tablet, Rfl: 1  •  glucose blood (OneTouch Verio) test strip, Test up to four times daily. , Disp: 360 each, Rfl: 1  •  HumuLIN 70/30 (70-30) 100 UNIT/ML subcutaneous injection, Inject 18 Units under the skin 2 (two) times a day before meals, Disp: 32.4 mL, Rfl: 1  •  hydrALAZINE (APRESOLINE) 25 mg tablet, Take 1 tablet (25 mg total) by mouth 3 (three) times a day, Disp: 90 tablet, Rfl: 2  •  levothyroxine 125 mcg tablet, Take 1 tablet (125 mcg total) by mouth daily, Disp: 90 tablet, Rfl: 1  •  lisinopril (ZESTRIL) 40 mg tablet, Take 1 tablet (40 mg total) by mouth daily, Disp: 90 tablet, Rfl: 1  •  metFORMIN (GLUCOPHAGE) 1000 MG tablet, Take 1 tablet (1,000 mg total) by mouth 2 (two) times a day, Disp: 180 tablet, Rfl: 1  •  metFORMIN (GLUCOPHAGE) 500 mg tablet, Take 1 tablet by mouth 2 (two) times a day (Patient not taking: Reported on 5/30/2023), Disp: , Rfl:   •  mirtazapine (REMERON) 15 mg tablet, Take 1 tablet by mouth in the morning, Disp: , Rfl:   •  mupirocin (BACTROBAN) 2 % ointment, apply to wound as directed, Disp: 22 g, Rfl: 3  •  naloxone (NARCAN) 4 mg/0.1 mL nasal spray, 1 spray into each nostril once as needed, Disp: , Rfl:   •  ondansetron (ZOFRAN-ODT) 4 mg disintegrating tablet, Take 1 tablet by mouth every 8 (eight) hours as needed (Patient not taking: Reported on 6/7/2023), Disp: , Rfl:   •  tamsulosin (FLOMAX) 0.4 mg, Take 1 capsule by mouth in the morning, Disp: , Rfl:   No current facility-administered medications for this visit. Review of Systems   Constitutional: Negative for chills, fatigue and fever. HENT: Negative for congestion, hearing loss and postnasal drip. Respiratory: Negative for cough and shortness of breath. Cardiovascular: Negative for leg swelling. Musculoskeletal: Negative for gait problem. Skin: Positive for wound (Left forearm). Negative for rash. Neurological: Negative for numbness. Hematological: Does not bruise/bleed easily. Psychiatric/Behavioral: Negative. Objective:  /90   Pulse 80   Temp 98.8 °F (37.1 °C)   Resp 16         Physical Exam  Vitals and nursing note reviewed. Constitutional:       Appearance: Normal appearance. He is well-developed and normal weight. HENT:      Head: Normocephalic and atraumatic. Cardiovascular:      Rate and Rhythm: Normal rate. Pulmonary:      Effort: Pulmonary effort is normal.   Musculoskeletal:        Arms:       Comments: Amputation right upper extremity   Skin:     General: Skin is warm and dry. Findings: Wound present. No erythema. Comments: Open wound in the midst of chronic scar tissue. Few areas with slough. No exposed bone. But there are hardened areas that are suggestive of bone. Tender to palpation without any erythema. No malodor. No changes. Neurological:      Mental Status: He is alert and oriented to person, place, and time.    Psychiatric:         Attention and Perception: Attention normal.         Mood and Affect: Mood and affect normal.         Behavior: Behavior is cooperative. Cognition and Memory: Cognition normal.             Wound 06/07/23 Other (comment) Arm Left;Posterior; Lower (Active)   Wound Image Images linked 07/31/23 1326   Wound Description Yellow;Pink;Epithelialization;Slough;Dry 07/31/23 1328   Heidy-wound Assessment Scar Tissue; Intact;Dry 07/31/23 1328   Wound Length (cm) 4.2 cm 07/31/23 1328   Wound Width (cm) 2.1 cm 07/31/23 1328   Wound Depth (cm) 0.1 cm 07/31/23 1328   Wound Surface Area (cm^2) 8.82 cm^2 07/31/23 1328   Wound Volume (cm^3) 0.882 cm^3 07/31/23 1328   Calculated Wound Volume (cm^3) 0.88 cm^3 07/31/23 1328   Change in Wound Size % 47.31 07/31/23 1328   Drainage Amount Small 07/31/23 1328   Drainage Description Yellow 07/31/23 1328   Non-staged Wound Description Full thickness 07/31/23 1328   Dressing Status Other (Comment) (no dressing upon visit) 07/31/23 1328                 No results found for: "HGBA1C"    Wound Instructions:  Orders Placed This Encounter   Procedures   • Wound cleansing and dressings     Wound cleansing and dressings                                                    Shower on dressing change days . Cleanse wound with mild soap and water (recommend dove unscented) and rinse well with clear water. Pat dry with gauze before applying a new dressing     Left arm wound:    Apply aquaphor healing ointment or Cerve healing ointment to wound  (can be found at [address] [employer] or drug store)   ---vaseline applied at wound care today                            Cover with border foam  Change dressing every other day.    Consider wearing your padded sleeve over left arm to protect it while wound continues to heal     Please eat 3-4 servings protein foods every day     Reduce smoking as much as you can with a focus toward eventually quitting.        Today's wound treatment note:  Cleansed with NSS and redressed as ordered above            Standing Status:   Future     Standing Expiration Date:   7/31/2024 Jazmine Hamilton MD, CHT, CWS    Portions of the record may have been created with voice recognition software. Occasional wrong word or "sound alike" substitutions may have occurred due to the inherent limitations of voice recognition software. Read the chart carefully and recognize, using context, where substitutions have occurred.

## 2023-07-31 NOTE — PATIENT INSTRUCTIONS
Orders Placed This Encounter   Procedures    Wound cleansing and dressings     Wound cleansing and dressings                                                    Shower on dressing change days . Cleanse wound with mild soap and water (recommend dove unscented) and rinse well with clear water. Pat dry with gauze before applying a new dressing     Left arm wound:    Apply aquaphor healing ointment or Cerve healing ointment to wound  (can be found at [employer] or drug store)   ---vaseline applied at wound care today                            Cover with border foam  Change dressing every other day. Consider wearing your padded sleeve over left arm to protect it while wound continues to heal     Please eat 3-4 servings protein foods every day     Reduce smoking as much as you can with a focus toward eventually quitting.         Today's wound treatment note:  Cleansed with NSS and redressed as ordered above            Standing Status:   Future     Standing Expiration Date:   7/31/2024

## 2023-08-01 RX ORDER — MIRTAZAPINE 30 MG/1
1 TABLET, FILM COATED ORAL DAILY
COMMUNITY
Start: 2023-07-25

## 2023-08-02 ENCOUNTER — OFFICE VISIT (OUTPATIENT)
Dept: FAMILY MEDICINE CLINIC | Facility: CLINIC | Age: 53
End: 2023-08-02
Payer: COMMERCIAL

## 2023-08-02 VITALS
BODY MASS INDEX: 31.87 KG/M2 | WEIGHT: 222.6 LBS | HEART RATE: 97 BPM | OXYGEN SATURATION: 98 % | DIASTOLIC BLOOD PRESSURE: 88 MMHG | TEMPERATURE: 98.2 F | HEIGHT: 70 IN | SYSTOLIC BLOOD PRESSURE: 160 MMHG | RESPIRATION RATE: 18 BRPM

## 2023-08-02 DIAGNOSIS — E11.9 DIABETES MELLITUS TYPE 2, INSULIN DEPENDENT (HCC): Primary | ICD-10-CM

## 2023-08-02 DIAGNOSIS — I10 PRIMARY HYPERTENSION: ICD-10-CM

## 2023-08-02 DIAGNOSIS — Z79.4 DIABETES MELLITUS TYPE 2, INSULIN DEPENDENT (HCC): Primary | ICD-10-CM

## 2023-08-02 DIAGNOSIS — Z79.4 DIABETES MELLITUS TYPE 2, INSULIN DEPENDENT (HCC): ICD-10-CM

## 2023-08-02 DIAGNOSIS — E11.9 DIABETES MELLITUS TYPE 2, INSULIN DEPENDENT (HCC): ICD-10-CM

## 2023-08-02 LAB — SL AMB POCT HEMOGLOBIN AIC: 7.5 (ref ?–6.5)

## 2023-08-02 PROCEDURE — 83036 HEMOGLOBIN GLYCOSYLATED A1C: CPT | Performed by: FAMILY MEDICINE

## 2023-08-02 PROCEDURE — 93000 ELECTROCARDIOGRAM COMPLETE: CPT | Performed by: FAMILY MEDICINE

## 2023-08-02 PROCEDURE — 99215 OFFICE O/P EST HI 40 MIN: CPT | Performed by: FAMILY MEDICINE

## 2023-08-02 RX ORDER — INSULIN HUMAN 100 [IU]/ML
22 INJECTION, SUSPENSION SUBCUTANEOUS
Qty: 15 ML | Refills: 5 | Status: SHIPPED | OUTPATIENT
Start: 2023-08-02

## 2023-08-02 RX ORDER — INSULIN HUMAN 100 [IU]/ML
22 INJECTION, SUSPENSION SUBCUTANEOUS
Qty: 15 ML | Refills: 5 | Status: SHIPPED | OUTPATIENT
Start: 2023-08-02 | End: 2023-08-02

## 2023-08-02 NOTE — PROGRESS NOTES
Name: Chris Acosta      : 1970      MRN: [id number]  Encounter Provider: Devang Ramirez DO  Encounter Date: 2023   Encounter department: [address]     Keep appt with Nephrology. Will Rx Humulin Quick Pens and see if ins covers in [place]. BMI Counseling: Body mass index is 32.17 kg/m². The BMI is above normal. Nutrition recommendations include reducing portion sizes, consuming healthier snacks, decreasing soda and/or juice intake, moderation in carbohydrate intake and increasing intake of lean protein. Chief Complaint   Patient presents with   • Diabetes   • Hypertension   • Hypothyroidism     1. Diabetes mellitus type 2, insulin dependent (HCC)  -     POCT hemoglobin A1c  -     Hemoglobin A1C; Future; Expected date: 2023  -     insulin isophane-insulin regular (HumuLIN 70/30 KwikPen) 100 units/mL injection pen; Inject 22 Units under the skin 2 (two) times a day before meals    2. Primary hypertension  -     POCT ECG           Subjective     Patient came in early, visit start 12:45  Labs and refill. Wasn't able to have blood drawn, has history of IV drug use -  difficulty finding veins. Wound left forearm slowly healing. EKG today is Ok. A!C: 7.5 %. Insulin: Humulin 70/30, 20 U BID.n  Having problem drawing up the insulin with one arm. Insurance not covering the pen in [place]. 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 Has an appointment with nephrology this October. BP at store: 147/82  I have spent a total time of 45 minutes on 23 in caring for this patient including Risks and benefits of tx options, Instructions for management, Patient and family education, Impressions and Counseling / Coordination of care. Review of Systems   Constitutional: Negative. HENT: Negative. Eyes: Negative. Respiratory: Negative. Cardiovascular: Negative. Gastrointestinal: Negative. Genitourinary: Negative.     Musculoskeletal: Negative. Skin: Negative. Neurological: Negative. Psychiatric/Behavioral: Negative.         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[address])    • Hyperthyroidism      Past Surgical History:   Procedure Laterality Date   • ABOVE ELBOW ARM AMPUTATION Right      Family History   Problem Relation Age of Onset   • Diabetes Mother    • Hypertension Mother    • Thyroid disease Sister    • Hypertension Sister      Social History     Socioeconomic History   • Marital status: /Civil Union     Spouse name: None   • Number of children: None   • Years of education: None   • Highest education level: None   Occupational History   • None   Tobacco Use   • Smoking status: Every Day     Packs/day: 0.50     Types: Cigarettes   • Smokeless tobacco: Never   Vaping Use   • Vaping Use: Every day   Substance and Sexual Activity   • Alcohol use: Not Currently   • Drug use: Not Currently   • Sexual activity: None   Other Topics Concern   • None   Social History Narrative   • None     Social Determinants of Health     Financial Resource Strain: Not on file   Food Insecurity: Not on file   Transportation Needs: Not on file   Physical Activity: Not on file   Stress: Not on file   Social Connections: Not on file   Intimate Partner Violence: Not on file   Housing Stability: Not on file     Current Outpatient Medications on File Prior to Visit   Medication Sig   • atorvastatin (LIPITOR) 20 mg tablet Take 1 tablet (20 mg total) by mouth daily   • BD Insulin Syringe U/F 31G X 5/16" 1 ML MISC 2 (two) times a day   • buprenorphine-naloxone (Suboxone) 8-2 mg Place 3 Film under the tongue in the morning   • carvedilol (COREG) 12.5 mg tablet Take 1 tablet (12.5 mg total) by mouth 2 (two) times a day   • cloNIDine (CATAPRES) 0.1 mg tablet Take 1 tablet by mouth in the morning   • furosemide (LASIX) 20 mg tablet Take 1 tablet (20 mg total) by mouth daily TPN   • gabapentin (NEURONTIN) 300 mg capsule Take 1 capsule (300 mg total) by mouth 2 (two) times a day   • glipiZIDE (GLUCOTROL) 10 mg tablet Take 1 tablet (10 mg total) by mouth 2 (two) times a day   • glucose blood (OneTouch Verio) test strip Test up to four times daily. • HumuLIN 70/30 (70-30) 100 UNIT/ML subcutaneous injection Inject 18 Units under the skin 2 (two) times a day before meals   • hydrALAZINE (APRESOLINE) 25 mg tablet Take 1 tablet (25 mg total) by mouth 3 (three) times a day   • levothyroxine 125 mcg tablet Take 1 tablet (125 mcg total) by mouth daily   • lisinopril (ZESTRIL) 40 mg tablet Take 1 tablet (40 mg total) by mouth daily   • metFORMIN (GLUCOPHAGE) 1000 MG tablet Take 1 tablet (1,000 mg total) by mouth 2 (two) times a day   • mirtazapine (REMERON) 30 mg tablet Take 1 tablet by mouth in the morning   • mupirocin (BACTROBAN) 2 % ointment apply to wound as directed   • naloxone (NARCAN) 4 mg/0.1 mL nasal spray 1 spray into each nostril once as needed   • tamsulosin (FLOMAX) 0.4 mg Take 1 capsule by mouth in the morning   • buPROPion (WELLBUTRIN XL) 150 mg 24 hr tablet Take 2 tablets by mouth in the morning (Patient not taking: Reported on 6/7/2023)   • metFORMIN (GLUCOPHAGE) 500 mg tablet Take 1 tablet by mouth 2 (two) times a day   • mirtazapine (REMERON) 15 mg tablet Take 1 tablet by mouth in the morning   • ondansetron (ZOFRAN-ODT) 4 mg disintegrating tablet Take 1 tablet by mouth every 8 (eight) hours as needed (Patient not taking: Reported on 6/7/2023)     No Known Allergies    There is no immunization history on file for this patient. Objective     /88   Pulse 97   Temp 98.2 °F (36.8 °C) (Oral)   Resp 18   Ht 5' 9.75" (1.772 m)   Wt 101 kg (222 lb 9.6 oz)   SpO2 98%   BMI 32.17 kg/m²   Diabetic Foot Exam    Patient's shoes and socks removed. Right Foot/Ankle   Right Foot Inspection  Skin Exam: skin normal, skin intact and dry skin.  No warmth, no callus, no erythema, no maceration, no abnormal color, no pre-ulcer, no ulcer and no callus. Toe Exam: ROM and strength within normal limits and swelling. Sensory   Monofilament testing: diminished    Vascular  The right DP pulse is 2+. The right PT pulse is 1+. Left Foot/Ankle  Left Foot Inspection  Skin Exam: skin normal, skin intact and dry skin. No warmth, no erythema, no maceration, normal color, no pre-ulcer, no ulcer and no callus. Toe Exam: ROM and strength within normal limits and swelling. Sensory   Monofilament testing: diminished    Vascular  The left DP pulse is 2+. The left PT pulse is 1+. Assign Risk Category  No deformity present  Loss of protective sensation  Weak pulses  Risk: 2        Physical Exam  Constitutional:       Appearance: He is well-developed. HENT:      Head: Normocephalic and atraumatic. Right Ear: External ear normal.      Left Ear: External ear normal.      Nose: Nose normal.      Mouth/Throat:      Mouth: Mucous membranes are moist.      Pharynx: Oropharynx is clear. Eyes:      Conjunctiva/sclera: Conjunctivae normal.      Pupils: Pupils are equal, round, and reactive to light. Cardiovascular:      Rate and Rhythm: Normal rate and regular rhythm. Pulses: Pulses are weak. Dorsalis pedis pulses are 2+ on the right side and 2+ on the left side. Posterior tibial pulses are 1+ on the right side and 1+ on the left side. Heart sounds: Normal heart sounds. Pulmonary:      Effort: Pulmonary effort is normal.      Breath sounds: Normal breath sounds. Musculoskeletal:      Cervical back: Normal range of motion and neck supple. Feet:    Feet:      Right foot:      Skin integrity: Dry skin present. No ulcer, skin breakdown, erythema, warmth or callus. Left foot:      Skin integrity: Dry skin present. No ulcer, skin breakdown, erythema, warmth or callus. Skin:     General: Skin is warm and dry. Neurological:      Mental Status: He is alert and oriented to person, place, and time. Deep Tendon Reflexes: Reflexes are normal and symmetric. Psychiatric:         Mood and Affect: Mood normal.         Behavior: Behavior normal.         Thought Content:  Thought content normal.         Judgment: Judgment normal.       Emily Geronimo, DO

## 2023-10-10 DIAGNOSIS — E11.9 DIABETES MELLITUS TYPE 2, INSULIN DEPENDENT (HCC): ICD-10-CM

## 2023-10-10 DIAGNOSIS — Z79.4 DIABETES MELLITUS TYPE 2, INSULIN DEPENDENT (HCC): ICD-10-CM

## 2023-10-10 RX ORDER — PEN NEEDLE, DIABETIC 30 GX5/16"
NEEDLE, DISPOSABLE MISCELLANEOUS 2 TIMES DAILY
Qty: 180 EACH | Refills: 1 | Status: SHIPPED | OUTPATIENT
Start: 2023-10-10 | End: 2024-04-07

## 2023-10-10 RX ORDER — INSULIN HUMAN 100 [IU]/ML
22 INJECTION, SUSPENSION SUBCUTANEOUS
Qty: 15 ML | Refills: 5 | Status: SHIPPED | OUTPATIENT
Start: 2023-10-10

## 2023-10-12 DIAGNOSIS — Z79.4 DIABETES MELLITUS TYPE 2, INSULIN DEPENDENT (HCC): ICD-10-CM

## 2023-10-12 DIAGNOSIS — Z12.5 PROSTATE CANCER SCREENING: Primary | ICD-10-CM

## 2023-10-12 DIAGNOSIS — E11.9 DIABETES MELLITUS TYPE 2, INSULIN DEPENDENT (HCC): ICD-10-CM

## 2023-10-12 DIAGNOSIS — E03.9 HYPOTHYROIDISM, UNSPECIFIED TYPE: ICD-10-CM

## 2023-10-12 DIAGNOSIS — E78.00 HYPERCHOLESTEREMIA: ICD-10-CM

## 2023-10-16 ENCOUNTER — HOSPITAL ENCOUNTER (EMERGENCY)
Facility: HOSPITAL | Age: 53
Discharge: HOME | End: 2023-10-16
Attending: EMERGENCY MEDICINE
Payer: COMMERCIAL

## 2023-10-16 ENCOUNTER — APPOINTMENT (EMERGENCY)
Dept: NON INVASIVE DIAGNOSTICS | Facility: HOSPITAL | Age: 53
End: 2023-10-16
Payer: COMMERCIAL

## 2023-10-16 ENCOUNTER — APPOINTMENT (EMERGENCY)
Dept: RADIOLOGY | Facility: HOSPITAL | Age: 53
End: 2023-10-16
Payer: COMMERCIAL

## 2023-10-16 VITALS
TEMPERATURE: 98 F | RESPIRATION RATE: 16 BRPM | HEART RATE: 85 BPM | WEIGHT: 232.9 LBS | DIASTOLIC BLOOD PRESSURE: 110 MMHG | OXYGEN SATURATION: 98 % | SYSTOLIC BLOOD PRESSURE: 206 MMHG | BODY MASS INDEX: 33.66 KG/M2

## 2023-10-16 DIAGNOSIS — M79.671 RIGHT FOOT PAIN: ICD-10-CM

## 2023-10-16 DIAGNOSIS — I87.2 VENOUS INSUFFICIENCY OF RIGHT LEG: Primary | ICD-10-CM

## 2023-10-16 DIAGNOSIS — E11.65 HYPERGLYCEMIA DUE TO DIABETES MELLITUS (HCC): ICD-10-CM

## 2023-10-16 DIAGNOSIS — M79.89 LEG SWELLING: ICD-10-CM

## 2023-10-16 LAB — GLUCOSE SERPL-MCNC: 301 MG/DL (ref 65–140)

## 2023-10-16 PROCEDURE — 71046 X-RAY EXAM CHEST 2 VIEWS: CPT

## 2023-10-16 PROCEDURE — 73630 X-RAY EXAM OF FOOT: CPT

## 2023-10-16 PROCEDURE — 82948 REAGENT STRIP/BLOOD GLUCOSE: CPT

## 2023-10-16 PROCEDURE — 93971 EXTREMITY STUDY: CPT

## 2023-10-16 NOTE — ED PROVIDER NOTES
History  Chief Complaint   Patient presents with    Leg Swelling     Pt reports he has had right leg swelling and pain for one month now. Reports he decided to come in because his right foot is now painful. Patient is a 50-year-old smoker. He has diabetes. He has hypertension and hypothyroidism. Rebeka Malone He presents to the emergency room with a 1 month history of pain and swelling to the right lower extremity. He is also complaining of atraumatic pain to his right foot. Mostly to the arch. He has no history of thromboembolic disease. No chest pain or trouble breathing. No fever or chills. Symptoms are moderate in severity without aggravating or relieving factors. Prior to Admission Medications   Prescriptions Last Dose Informant Patient Reported? Taking?    BD Insulin Syringe U/F 31G X 5/16" 1 ML MISC  Self Yes No   Si (two) times a day   HumuLIN 70/30 (70-30) 100 UNIT/ML subcutaneous injection  Self No No   Sig: Inject 18 Units under the skin 2 (two) times a day before meals   Insulin Pen Needle (Pen Needles 516") 30G X 8 MM MISC   No No   Sig: Use 2 (two) times a day   atorvastatin (LIPITOR) 20 mg tablet  Self No No   Sig: Take 1 tablet (20 mg total) by mouth daily   buPROPion (WELLBUTRIN XL) 150 mg 24 hr tablet  Self Yes No   Sig: Take 2 tablets by mouth in the morning   Patient not taking: Reported on 2023   buprenorphine-naloxone (Suboxone) 8-2 mg  Self Yes No   Sig: Place 3 Film under the tongue in the morning   carvedilol (COREG) 12.5 mg tablet  Self No No   Sig: Take 1 tablet (12.5 mg total) by mouth 2 (two) times a day   cloNIDine (CATAPRES) 0.1 mg tablet  Self Yes No   Sig: Take 1 tablet by mouth in the morning   furosemide (LASIX) 20 mg tablet  Self No No   Sig: Take 1 tablet (20 mg total) by mouth daily TPN   gabapentin (NEURONTIN) 300 mg capsule  Self No No   Sig: Take 1 capsule (300 mg total) by mouth 2 (two) times a day   glipiZIDE (GLUCOTROL) 10 mg tablet  Self No No   Sig: Take 1 tablet (10 mg total) by mouth 2 (two) times a day   glucose blood (OneTouch Verio) test strip  Self No No   Sig: Test up to four times daily.    hydrALAZINE (APRESOLINE) 25 mg tablet   No No   Sig: TAKE 1 TABLET BY MOUTH THREE TIMES A DAY   insulin isophane-insulin regular (HumuLIN 70/30 KwikPen) 100 units/mL injection pen   No No   Sig: Inject 22 Units under the skin 2 (two) times a day before meals   levothyroxine 125 mcg tablet  Self No No   Sig: Take 1 tablet (125 mcg total) by mouth daily   lisinopril (ZESTRIL) 40 mg tablet  Self No No   Sig: Take 1 tablet (40 mg total) by mouth daily   metFORMIN (GLUCOPHAGE) 1000 MG tablet  Self No No   Sig: Take 1 tablet (1,000 mg total) by mouth 2 (two) times a day   metFORMIN (GLUCOPHAGE) 500 mg tablet  Self Yes No   Sig: Take 1 tablet by mouth 2 (two) times a day   mirtazapine (REMERON) 15 mg tablet  Self Yes No   Sig: Take 1 tablet by mouth in the morning   mirtazapine (REMERON) 30 mg tablet  Self Yes No   Sig: Take 1 tablet by mouth in the morning   mupirocin (BACTROBAN) 2 % ointment  Self No No   Sig: apply to wound as directed   naloxone (NARCAN) 4 mg/0.1 mL nasal spray  Self Yes No   Si spray into each nostril once as needed   ondansetron (ZOFRAN-ODT) 4 mg disintegrating tablet  Self Yes No   Sig: Take 1 tablet by mouth every 8 (eight) hours as needed   Patient not taking: Reported on 2023   tamsulosin (FLOMAX) 0.4 mg  Self Yes No   Sig: Take 1 capsule by mouth in the morning      Facility-Administered Medications: None       Past Medical History:   Diagnosis Date    Diabetes 1.5, managed as type 2 (HCC)     Hypertension     Hyperthyroidism     Skin cancer of arm, right        Past Surgical History:   Procedure Laterality Date    ABOVE ELBOW ARM AMPUTATION Right        Family History   Problem Relation Age of Onset    Diabetes Mother     Hypertension Mother     Thyroid disease Sister     Hypertension Sister      I have reviewed and agree with the history as documented. E-Cigarette/Vaping    E-Cigarette Use Never User      E-Cigarette/Vaping Substances     Social History     Tobacco Use    Smoking status: Every Day     Packs/day: 0.50     Types: Cigarettes    Smokeless tobacco: Never   Vaping Use    Vaping Use: Never used   Substance Use Topics    Alcohol use: Not Currently    Drug use: Not Currently     Comment: on suboxone       Review of Systems   Constitutional:  Negative for chills and fever. HENT:  Negative for rhinorrhea and sore throat. Eyes:  Negative for pain, redness and visual disturbance. Respiratory:  Negative for cough and shortness of breath. Cardiovascular:  Positive for leg swelling. Negative for chest pain. Gastrointestinal:  Negative for abdominal pain, diarrhea and vomiting. Endocrine: Negative for polydipsia and polyuria. Genitourinary:  Negative for dysuria, frequency and hematuria. Musculoskeletal:  Negative for back pain and neck pain. Skin:  Negative for rash and wound. Allergic/Immunologic: Negative for immunocompromised state. Neurological:  Negative for weakness, numbness and headaches. Psychiatric/Behavioral:  Negative for hallucinations and suicidal ideas. All other systems reviewed and are negative. Physical Exam  Physical Exam  Vitals reviewed. Constitutional:       General: He is not in acute distress. HENT:      Head: Normocephalic and atraumatic. Nose: Nose normal.      Mouth/Throat:      Mouth: Mucous membranes are moist.   Cardiovascular:      Rate and Rhythm: Normal rate and regular rhythm. Heart sounds:      No gallop. Pulmonary:      Effort: Pulmonary effort is normal. No respiratory distress. Breath sounds: Wheezing present. Abdominal:      General: Bowel sounds are normal. There is no distension. Palpations: Abdomen is soft. Tenderness: There is no abdominal tenderness. Musculoskeletal:         General: Swelling and tenderness present.       Cervical back: Neck supple. No rigidity. Comments: Right arm has been amputated. Right leg is more swollen than the left. There are chronic skin changes. Examination of the foot is negative for diabetic foot wounds or puncture wounds. Good distal pulse. The foot is sensate. Skin:     General: Skin is warm and dry. Neurological:      General: No focal deficit present. Mental Status: He is alert and oriented to person, place, and time. Psychiatric:         Mood and Affect: Mood normal.         Behavior: Behavior normal.         Vital Signs  ED Triage Vitals [10/16/23 1603]   Temperature Pulse Respirations Blood Pressure SpO2   98 °F (36.7 °C) 93 16 (!) 195/111 96 %      Temp Source Heart Rate Source Patient Position - Orthostatic VS BP Location FiO2 (%)   Tympanic Monitor Sitting Left arm --      Pain Score       --           Vitals:    10/16/23 1603 10/16/23 1715   BP: (!) 195/111 (!) 206/110   Pulse: 93 85   Patient Position - Orthostatic VS: Sitting Sitting         Visual Acuity      ED Medications  Medications - No data to display    Diagnostic Studies  Results Reviewed       Procedure Component Value Units Date/Time    Fingerstick Glucose (POCT) [[id number]]  (Abnormal) Collected: 10/16/23 1636    Lab Status: Final result Updated: 10/16/23 1637     POC Glucose 301 mg/dl                    XR chest 2 views   ED Interpretation by Regla Henriquez MD (10/16 1739)   No infiltrates. No CHF. No cardiomegaly. XR foot 3+ views RIGHT   ED Interpretation by Regla Henriquez MD (10/16 1739)   No fracture or dislocation      VAS lower limb venous duplex study, unilateral/limited    (Results Pending)              Procedures  Procedures         ED Course                               SBIRT 20yo+      Flowsheet Row Most Recent Value   Initial Alcohol Screen: US AUDIT-C     1. How often do you have a drink containing alcohol? 0 Filed at: 10/16/2023 1604   2.  How many drinks containing alcohol do you have on a typical day you are drinking? 0 Filed at: 10/16/2023 1607   3a. Male UNDER 65: How often do you have five or more drinks on one occasion? 0 Filed at: 10/16/2023 1607   3b. FEMALE Any Age, or MALE 65+: How often do you have 4 or more drinks on one occassion? 0 Filed at: 10/16/2023 1607   Audit-C Score 0 Filed at: 10/16/2023 1607   MITCH: How many times in the past year have you. .. Used an illegal drug or used a prescription medication for non-medical reasons? Never Filed at: 10/16/2023 1607                      Medical Decision Making  Ultrasound was negative for DVT. Patient does not appear in congestive heart failure. Most likely cause of the swelling in his legs is venous insufficiency. Recommended elevation and follow-up with primary MD.  Compression stockings. Blood glucose was elevated. Doubt DKA. This too can be followed up with his primary MD.    Amount and/or Complexity of Data Reviewed  Labs: ordered. Decision-making details documented in ED Course. Radiology: ordered and independent interpretation performed. Decision-making details documented in ED Course. Details: Chest x-ray was negative for congestive heart failure. Foot x-ray was negative for fracture, dislocation, or foreign body. Risk  Decision regarding hospitalization.              Disposition  Final diagnoses:   Venous insufficiency of right leg   Leg swelling   Hyperglycemia due to diabetes mellitus (HCC)   Right foot pain     Time reflects when diagnosis was documented in both MDM as applicable and the Disposition within this note       Time User Action Codes Description Comment    10/16/2023  5:45 PM Tildon Peels Add [I87.2] Venous insufficiency of right leg     10/16/2023  5:45 PM Tildon Peels Add [E89.55] Leg swelling     10/16/2023  5:45 PM Tildon Peels Add [R73.9] Hyperglycemia     10/16/2023  5:45 PM Tildon Peels Remove [R73.9] Hyperglycemia     10/16/2023  5:45 PM Tildon Peels Add [E11.65] Hyperglycemia due to diabetes mellitus ([facility])     10/16/2023  5:45 PM Evie Escobedo Add [U17.638] Right foot pain           ED Disposition       ED Disposition   Discharge    Condition   Stable    Date/Time   Mon Oct 16, 2023 1745    Comment   Tim Hoang discharge to home/self care. Follow-up Information       Follow up With Specialties Details Why Contact Info Additional DO [facility] In 1 week  2319 [address] [facility] [address]  [address] [facility] In 1 week  [address] [facility] [address] [place] [id number]  [address] [place] [address], [id number] [phone]            Patient's Medications   Discharge Prescriptions    No medications on file       No discharge procedures on file.     PDMP Review       None            ED Provider  Electronically Signed by             Evie Escobedo MD  10/16/23 0543

## 2023-10-17 PROCEDURE — 93971 EXTREMITY STUDY: CPT | Performed by: SURGERY

## 2023-10-25 DIAGNOSIS — Z79.4 DIABETES MELLITUS TYPE 2, INSULIN DEPENDENT (HCC): Primary | ICD-10-CM

## 2023-10-25 DIAGNOSIS — E11.9 DIABETES MELLITUS TYPE 2, INSULIN DEPENDENT (HCC): Primary | ICD-10-CM

## 2023-10-25 RX ORDER — INSULIN ASPART 100 [IU]/ML
22 INJECTION, SUSPENSION SUBCUTANEOUS
Qty: 15 ML | Refills: 5 | Status: SHIPPED | OUTPATIENT
Start: 2023-10-25

## 2023-11-06 ENCOUNTER — APPOINTMENT (OUTPATIENT)
Dept: LAB | Facility: HOSPITAL | Age: 53
End: 2023-11-06
Payer: COMMERCIAL

## 2023-11-06 DIAGNOSIS — Z12.5 PROSTATE CANCER SCREENING: ICD-10-CM

## 2023-11-06 DIAGNOSIS — Z79.4 DIABETES MELLITUS TYPE 2, INSULIN DEPENDENT (HCC): ICD-10-CM

## 2023-11-06 DIAGNOSIS — E03.9 HYPOTHYROIDISM, UNSPECIFIED TYPE: ICD-10-CM

## 2023-11-06 DIAGNOSIS — E11.9 DIABETES MELLITUS TYPE 2, INSULIN DEPENDENT (HCC): ICD-10-CM

## 2023-11-06 DIAGNOSIS — E78.00 HYPERCHOLESTEREMIA: ICD-10-CM

## 2023-11-06 LAB
PSA SERPL-MCNC: 0.18 NG/ML (ref 0–4)
TSH SERPL DL<=0.05 MIU/L-ACNC: 3.22 UIU/ML (ref 0.45–4.5)

## 2023-11-06 PROCEDURE — G0103 PSA SCREENING: HCPCS

## 2023-11-06 PROCEDURE — 36415 COLL VENOUS BLD VENIPUNCTURE: CPT

## 2023-11-06 PROCEDURE — 84443 ASSAY THYROID STIM HORMONE: CPT

## 2023-11-08 ENCOUNTER — OFFICE VISIT (OUTPATIENT)
Dept: FAMILY MEDICINE CLINIC | Facility: CLINIC | Age: 53
End: 2023-11-08
Payer: COMMERCIAL

## 2023-11-08 VITALS
DIASTOLIC BLOOD PRESSURE: 100 MMHG | BODY MASS INDEX: 33.53 KG/M2 | HEIGHT: 70 IN | WEIGHT: 234.2 LBS | TEMPERATURE: 98.9 F | SYSTOLIC BLOOD PRESSURE: 180 MMHG | HEART RATE: 86 BPM | OXYGEN SATURATION: 98 % | RESPIRATION RATE: 18 BRPM

## 2023-11-08 DIAGNOSIS — M79.604 RIGHT LEG PAIN: ICD-10-CM

## 2023-11-08 DIAGNOSIS — E11.9 DIABETES MELLITUS TYPE 2, INSULIN DEPENDENT (HCC): ICD-10-CM

## 2023-11-08 DIAGNOSIS — I10 PRIMARY HYPERTENSION: ICD-10-CM

## 2023-11-08 DIAGNOSIS — Z79.4 DIABETES MELLITUS TYPE 2, INSULIN DEPENDENT (HCC): ICD-10-CM

## 2023-11-08 DIAGNOSIS — M79.671 RIGHT FOOT PAIN: ICD-10-CM

## 2023-11-08 DIAGNOSIS — E03.9 HYPOTHYROIDISM, UNSPECIFIED TYPE: ICD-10-CM

## 2023-11-08 DIAGNOSIS — I10 UNCONTROLLED HYPERTENSION: ICD-10-CM

## 2023-11-08 DIAGNOSIS — Z00.00 MEDICARE ANNUAL WELLNESS VISIT, INITIAL: Primary | ICD-10-CM

## 2023-11-08 LAB
CREAT UR-MCNC: 50.3 MG/DL
MICROALBUMIN UR-MCNC: 529.1 MG/L
MICROALBUMIN/CREAT 24H UR: 1052 MG/G CREATININE (ref 0–30)
SL AMB POCT HEMOGLOBIN AIC: 7.7 (ref ?–6.5)

## 2023-11-08 PROCEDURE — 83036 HEMOGLOBIN GLYCOSYLATED A1C: CPT | Performed by: FAMILY MEDICINE

## 2023-11-08 PROCEDURE — G0402 INITIAL PREVENTIVE EXAM: HCPCS | Performed by: FAMILY MEDICINE

## 2023-11-08 PROCEDURE — 82570 ASSAY OF URINE CREATININE: CPT | Performed by: FAMILY MEDICINE

## 2023-11-08 PROCEDURE — 99215 OFFICE O/P EST HI 40 MIN: CPT | Performed by: FAMILY MEDICINE

## 2023-11-08 PROCEDURE — 82043 UR ALBUMIN QUANTITATIVE: CPT | Performed by: FAMILY MEDICINE

## 2023-11-08 RX ORDER — HYDRALAZINE HYDROCHLORIDE 25 MG/1
25 TABLET, FILM COATED ORAL 3 TIMES DAILY
Qty: 90 TABLET | Refills: 1 | Status: SHIPPED | OUTPATIENT
Start: 2023-11-08

## 2023-11-08 RX ORDER — HYDRALAZINE HYDROCHLORIDE 25 MG/1
25 TABLET, FILM COATED ORAL 3 TIMES DAILY
Qty: 90 TABLET | Refills: 0 | Status: SHIPPED | OUTPATIENT
Start: 2023-11-08 | End: 2023-11-08 | Stop reason: SDUPTHER

## 2023-11-08 RX ORDER — LEVOTHYROXINE SODIUM 0.12 MG/1
125 TABLET ORAL DAILY
Qty: 90 TABLET | Refills: 1 | Status: SHIPPED | OUTPATIENT
Start: 2023-11-08 | End: 2024-05-06

## 2023-11-08 RX ORDER — LISINOPRIL 40 MG/1
40 TABLET ORAL DAILY
Qty: 90 TABLET | Refills: 1 | Status: SHIPPED | OUTPATIENT
Start: 2023-11-08 | End: 2024-05-06

## 2023-11-08 RX ORDER — GLIPIZIDE 10 MG/1
10 TABLET ORAL 2 TIMES DAILY
Qty: 180 TABLET | Refills: 1 | Status: SHIPPED | OUTPATIENT
Start: 2023-11-08 | End: 2024-05-06

## 2023-11-08 NOTE — PATIENT INSTRUCTIONS
Medicare Preventive Visit Patient Instructions  Thank you for completing your Welcome to Medicare Visit or Medicare Annual Wellness Visit today. Your next wellness visit will be due in one year (11/8/2024). The screening/preventive services that you may require over the next 5-10 years are detailed below. Some tests may not apply to you based off risk factors and/or age. Screening tests ordered at today's visit but not completed yet may show as past due. Also, please note that scanned in results may not display below. Preventive Screenings:  Service Recommendations Previous Testing/Comments   Colorectal Cancer Screening  Colonoscopy    Fecal Occult Blood Test (FOBT)/Fecal Immunochemical Test (FIT)  Fecal DNA/Cologuard Test  Flexible Sigmoidoscopy Age: 43-73 years old   Colonoscopy: every 10 years (May be performed more frequently if at higher risk)  OR  FOBT/FIT: every 1 year  OR  Cologuard: every 3 years  OR  Sigmoidoscopy: every 5 years  Screening may be recommended earlier than age 39 if at higher risk for colorectal cancer. Also, an individualized decision between you and your healthcare provider will decide whether screening between the ages of 77-80 would be appropriate.  Colonoscopy: Not on file  FOBT/FIT: Not on file  Cologuard: Not on file  Sigmoidoscopy: Not on file    Screening Current     Prostate Cancer Screening Individualized decision between patient and health care provider in men between ages of 53-66   Medicare will cover every 12 months beginning on the day after your 50th birthday PSA: 0.18 ng/mL     Screening Current     Hepatitis C Screening Once for adults born between [address] [place] [address]  More frequently in patients at high risk for Hepatitis C Hep C Antibody: Not on file        Diabetes Screening 1-2 times per year if you're at risk for diabetes or have pre-diabetes Fasting glucose: No results in last 5 years (No results in last 5 years)  A1C: 7.5 (8/2/2023)  Screening Not Indicated  History Diabetes   Cholesterol Screening Once every 5 years if you don't have a lipid disorder. May order more often based on risk factors. Lipid panel: Not on file  Screening Not Indicated  History Lipid Disorder      Other Preventive Screenings Covered by Medicare:  Abdominal Aortic Aneurysm (AAA) Screening: covered once if your at risk. You're considered to be at risk if you have a family history of AAA or a male between the age of 70-76 who smoking at least 100 cigarettes in your lifetime. Lung Cancer Screening: covers low dose CT scan once per year if you meet all of the following conditions: (1) Age 48-67; (2) No signs or symptoms of lung cancer; (3) Current smoker or have quit smoking within the last 15 years; (4) You have a tobacco smoking history of at least 20 pack years (packs per day x number of years you smoked); (5) You get a written order from a healthcare provider. Glaucoma Screening: covered annually if you're considered high risk: (1) You have diabetes OR (2) Family history of glaucoma OR (3)  aged 48 and older OR (3)  American aged 72 and older  Osteoporosis Screening: covered every 2 years if you meet one of the following conditions: (1) Have a vertebral abnormality; (2) On glucocorticoid therapy for more than 3 months; (3) Have primary hyperparathyroidism; (4) On osteoporosis medications and need to assess response to drug therapy. HIV Screening: covered annually if you're between the age of 14-79. Also covered annually if you are younger than 13 and older than 72 with risk factors for HIV infection. For pregnant patients, it is covered up to 3 times per pregnancy.     Immunizations:  Immunization Recommendations   Influenza Vaccine Annual influenza vaccination during flu season is recommended for all persons aged >= 6 months who do not have contraindications   Pneumococcal Vaccine   * Pneumococcal conjugate vaccine = PCV13 (Prevnar 13), PCV15 (Vaxneuvance), PCV20 (Prevnar 20)  * Pneumococcal polysaccharide vaccine = PPSV23 (Pneumovax) Adults 76-17 yo with certain risk factors or if 69+ yo  If never received any pneumonia vaccine: recommend Prevnar 20 (PCV20)  Give PCV20 if previously received 1 dose of PCV13 or PPSV23   Hepatitis B Vaccine 3 dose series if at intermediate or high risk (ex: diabetes, end stage renal disease, liver disease)   Respiratory syncytial virus (RSV) Vaccine - COVERED BY MEDICARE PART D  * RSVPreF3 (Arexvy) CDC recommends that adults 61years of age and older may receive a single dose of RSV vaccine using shared clinical decision-making (SCDM)   Tetanus (Td) Vaccine - COST NOT COVERED BY MEDICARE PART B Following completion of primary series, a booster dose should be given every 10 years to maintain immunity against tetanus. Td may also be given as tetanus wound prophylaxis. Tdap Vaccine - COST NOT COVERED BY MEDICARE PART B Recommended at least once for all adults. For pregnant patients, recommended with each pregnancy. Shingles Vaccine (Shingrix) - COST NOT COVERED BY MEDICARE PART B  2 shot series recommended in those 19 years and older who have or will have weakened immune systems or those 50 years and older     Health Maintenance Due:      Topic Date Due   • Hepatitis C Screening  Never done   • HIV Screening  Never done   • Colorectal Cancer Screening  Never done     Immunizations Due:      Topic Date Due   • COVID-19 Vaccine (1) Never done   • Pneumococcal Vaccine: Pediatrics (0 to 5 Years) and At-Risk Patients (6 to 59 Years) (1 - PCV) Never done   • Influenza Vaccine (1) Never done     Advance Directives   What are advance directives? Advance directives are legal documents that state your wishes and plans for medical care. These plans are made ahead of time in case you lose your ability to make decisions for yourself. Advance directives can apply to any medical decision, such as the treatments you want, and if you want to donate organs.    What are the types of advance directives? There are many types of advance directives, and each state has rules about how to use them. You may choose a combination of any of the following:  Living will: This is a written record of the treatment you want. You can also choose which treatments you do not want, which to limit, and which to stop at a certain time. This includes surgery, medicine, IV fluid, and tube feedings. Durable power of  for [facility]): This is a written record that states who you want to make healthcare choices for you when you are unable to make them for yourself. This person, called a proxy, is usually a family member or a friend. You may choose more than 1 proxy. Do not resuscitate (DNR) order:  A DNR order is used in case your heart stops beating or you stop breathing. It is a request not to have certain forms of treatment, such as CPR. A DNR order may be included in other types of advance directives. Medical directive: This covers the care that you want if you are in a coma, near death, or unable to make decisions for yourself. You can list the treatments you want for each condition. Treatment may include pain medicine, surgery, blood transfusions, dialysis, IV or tube feedings, and a ventilator (breathing machine). Values history: This document has questions about your views, beliefs, and how you feel and think about life. This information can help others choose the care that you would choose. Why are advance directives important? An advance directive helps you control your care. Although spoken wishes may be used, it is better to have your wishes written down. Spoken wishes can be misunderstood, or not followed. Treatments may be given even if you do not want them. An advance directive may make it easier for your family to make difficult choices about your care.    Cigarette Smoking and Your Health   Risks to your health if you smoke:  Nicotine and other chemicals found in tobacco damage every cell in your body. Even if you are a light smoker, you have an increased risk for cancer, heart disease, and lung disease. If you are pregnant or have diabetes, smoking increases your risk for complications. Benefits to your health if you stop smoking: You decrease respiratory symptoms such as coughing, wheezing, and shortness of breath. You reduce your risk for cancers of the lung, mouth, throat, kidney, bladder, pancreas, stomach, and cervix. If you already have cancer, you increase the benefits of chemotherapy. You also reduce your risk for cancer returning or a second cancer from developing. You reduce your risk for heart disease, blood clots, heart attack, and stroke. You reduce your risk for lung infections, and diseases such as pneumonia, asthma, chronic bronchitis, and emphysema. Your circulation improves. More oxygen can be delivered to your body. If you have diabetes, you lower your risk for complications, such as kidney, artery, and eye diseases. You also lower your risk for nerve damage. Nerve damage can lead to amputations, poor vision, and blindness. You improve your body's ability to heal and to fight infections. For more information and support to stop smoking:   [employer]  Phone: 7- 734 - [phone]  Web Address: www.[employer]  Weight Management   Why it is important to manage your weight:  Being overweight increases your risk of health conditions such as heart disease, high blood pressure, type 2 diabetes, and certain types of cancer. It can also increase your risk for osteoarthritis, sleep apnea, and other respiratory problems. Aim for a slow, steady weight loss. Even a small amount of weight loss can lower your risk of health problems. How to lose weight safely:  A safe and healthy way to lose weight is to eat fewer calories and get regular exercise.  You can lose up about 1 pound a week by decreasing the number of calories you eat by 500 calories each day.   Healthy meal plan for weight management:  A healthy meal plan includes a variety of foods, contains fewer calories, and helps you stay healthy. A healthy meal plan includes the following:  Eat whole-grain foods more often. A healthy meal plan should contain fiber. Fiber is the part of grains, fruits, and vegetables that is not broken down by your body. Whole-grain foods are healthy and provide extra fiber in your diet. Some examples of whole-grain foods are whole-wheat breads and pastas, oatmeal, brown rice, and bulgur. Eat a variety of vegetables every day. Include dark, leafy greens such as spinach, kale, shyam greens, and mustard greens. Eat yellow and orange vegetables such as carrots, sweet potatoes, and winter squash. Eat a variety of fruits every day. Choose fresh or canned fruit (canned in its own juice or light syrup) instead of juice. Fruit juice has very little or no fiber. Eat low-fat dairy foods. Drink fat-free (skim) milk or 1% milk. Eat fat-free yogurt and low-fat cottage cheese. Try low-fat cheeses such as mozzarella and other reduced-fat cheeses. Choose meat and other protein foods that are low in fat. Choose beans or other legumes such as split peas or lentils. Choose fish, skinless poultry (chicken or turkey), or lean cuts of red meat (beef or pork). Before you cook meat or poultry, cut off any visible fat. Use less fat and oil. Try baking foods instead of frying them. Add less fat, such as margarine, sour cream, regular salad dressing and mayonnaise to foods. Eat fewer high-fat foods. Some examples of high-fat foods include french fries, doughnuts, ice cream, and cakes. Eat fewer sweets. Limit foods and drinks that are high in sugar. This includes candy, cookies, regular soda, and sweetened drinks. Exercise:  Exercise at least 30 minutes per day on most days of the week. Some examples of exercise include walking, biking, dancing, and swimming.  You can also fit in more physical activity by taking the stairs instead of the elevator or parking farther away from stores. Ask your healthcare provider about the best exercise plan for you. Narcotic (Opioid) Safety    Use narcotics safely:  Take prescribed narcotics exactly as directed  Do not give narcotics to others or take narcotics that belong to someone else  Do not mix narcotics without medicines or alcohol  Do not drive or operate heavy machinery after you take the narcotic  Monitor for side effects and notify your healthcare provider if you experienced side effects such as nausea, sleepiness, itching, or trouble thinking clearly. Manage constipation:    Constipation is the most common side effect of narcotic medicine. Constipation is when you have hard, dry bowel movements, or you go longer than usual between bowel movements. Tell your healthcare provider about all changes in your bowel movements while you are taking narcotics. He or she may recommend laxative medicine to help you have a bowel movement. He or she may also change the kind of narcotic you are taking, or change when you take it. The following are more ways you can prevent or relieve constipation:    Drink liquids as directed. You may need to drink extra liquids to help soften and move your bowels. Ask how much liquid to drink each day and which liquids are best for you. Eat high-fiber foods. This may help decrease constipation by adding bulk to your bowel movements. High-fiber foods include fruits, vegetables, whole-grain breads and cereals, and beans. Your healthcare provider or dietitian can help you create a high-fiber meal plan. Your provider may also recommend a fiber supplement if you cannot get enough fiber from food. Exercise regularly. Regular physical activity can help stimulate your intestines. Walking is a good exercise to prevent or relieve constipation. Ask which exercises are best for you. Schedule a time each day to have a bowel movement. This may help train your body to have regular bowel movements. Bend forward while you are on the toilet to help move the bowel movement out. Sit on the toilet for at least 10 minutes, even if you do not have a bowel movement. Store narcotics safely:   Store narcotics where others cannot easily get them. Keep them in a locked cabinet or secure area. Do not  keep them in a purse or other bag you carry with you. A person may be looking for something else and find the narcotics. Make sure narcotics are stored out of the reach of children. A child can easily overdose on narcotics. Narcotics may look like candy to a small child. The best way to dispose of narcotics: The laws vary by country and area. In the [facility], the best way is to return the narcotics through a take-back program. This program is offered by the [employer] The following are options for using the program:  Take the narcotics to a R[facility] collection site. The site is often a law enforcement center. Call your local law enforcement center for scheduled take-back days in your area. You will be given information on where to go if the collection site is in a different location. Take the narcotics to an approved pharmacy or hospital.  A pharmacy or hospital may be set up as a collection site. You will need to ask if it is a [facility] collection site if you were not directed there. A pharmacy or doctor's office may not be able to take back narcotics unless it is a R[facility] site. Use a mail-back system. This means you are given containers to put the narcotics into. You will then mail them in the containers. Use a take-back drop box. This is a place to leave the narcotics at any time. People and animals will not be able to get into the box. Your local law enforcement agency can tell you where to find a drop box in your area. Other ways to manage pain:   Ask your healthcare provider about non-narcotic medicines to control pain. Nonprescription medicines include NSAIDs (such as ibuprofen) and acetaminophen. Prescription medicines include muscle relaxers, antidepressants, and steroids. Pain may be managed without any medicines. Some ways to relieve pain include massage, aromatherapy, or meditation. Physical or occupational therapy may also help. For more information:   Drug Enforcement Administration  [address]  Phone: 9- 052 - [phone]  Web Address: [facility] [employer]/drug_disposal/    [address] [place] [address]  Phone: 9- 934 - [phone]  Web Address: http://[employer]/     © Copyright [employer] 2018 Information is for End User's use only and may not be sold, redistributed or otherwise used for commercial purposes.  All illustrations and images included in CareNotes® are the copyrighted property of [employer] or [address] [place] [address]

## 2023-11-08 NOTE — PROGRESS NOTES
Assessment and Plan:     Problem List Items Addressed This Visit       Diabetes mellitus type 2, insulin dependent (HCC)    Relevant Medications    glipiZIDE (GLUCOTROL) 10 mg tablet    Other Relevant Orders    POCT hemoglobin A1c (Completed)    Albumin / creatinine urine ratio    Hemoglobin A1C    Primary hypertension    Relevant Medications    lisinopril (ZESTRIL) 40 mg tablet    hydrALAZINE (APRESOLINE) 25 mg tablet     Other Visit Diagnoses       Medicare annual wellness visit, initial    -  Primary    Hypothyroidism, unspecified type        Relevant Medications    levothyroxine 125 mcg tablet    Uncontrolled hypertension        Relevant Medications    lisinopril (ZESTRIL) 40 mg tablet    hydrALAZINE (APRESOLINE) 25 mg tablet    Right leg pain        Relevant Orders    Ambulatory Referral to Physiatry    Right foot pain        Relevant Orders    Ambulatory Referral to Podiatry            Depression Screening and Follow-up Plan: Patient was screened for depression during today's encounter. They screened negative with a PHQ-2 score of 0. Preventive health issues were discussed with patient, and age appropriate screening tests were ordered as noted in patient's After Visit Summary. Personalized health advice and appropriate referrals for health education or preventive services given if needed, as noted in patient's After Visit Summary. .   History of Present Illness:     Patient presents for a Medicare Wellness Visit    Medicare PE. A1c: 7.7 %. Missed Nephrology appointment last week, needed labs first.  Having right leg apin, problem starding more than 2 - 3 hours. Ball of right foot hurting. Difficulty getting blood draw.   Labs incomplete    Diabetes    Hypertension       Patient Care Team:  Ermelinda Felder DO as PCP - General (Family Medicine)  Ermelinda Felder DO as PCP - PCP-[employer]-Medicaid ([facility])  Melissa Mcclelland MD (Nephrology)     Review of Systems:   I have spent a total time of 40 minutes on 11/08/23 in caring for this patient including Diagnostic results, Risks and benefits of tx options, Instructions for management, Patient and family education, Importance of tx compliance, Risk factor reductions, and Impressions. Review of Systems   Constitutional: Negative. HENT: Negative. Eyes: Negative. Respiratory: Negative. Cardiovascular: Negative. Gastrointestinal: Negative. Genitourinary:  Negative for testicular pain. Musculoskeletal:         Problem flexing right knee past 90 degrees. Denies knee pain. Tight distal hamstring. Skin: Negative. Neurological: Negative. Psychiatric/Behavioral: Negative.           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[address])     Hypertension     Hyperthyroidism     Skin cancer of arm, right      Past Surgical History:   Procedure Laterality Date    ABOVE ELBOW ARM AMPUTATION Right       Family History:     Family History   Problem Relation Age of Onset    Diabetes Mother     Hypertension Mother     Thyroid disease Sister     Hypertension Sister       Social History:     Social History     Socioeconomic History    Marital status: /Civil Union     Spouse name: None    Number of children: None    Years of education: None    Highest education level: None   Occupational History    None   Tobacco Use    Smoking status: Every Day     Packs/day: 0.50     Types: Cigarettes    Smokeless tobacco: Never   Vaping Use    Vaping Use: Never used   Substance and Sexual Activity    Alcohol use: Not Currently    Drug use: Not Currently     Comment: on suboxone    Sexual activity: None   Other Topics Concern    None   Social History Narrative    None     Social Determinants of Health     Financial Resource Strain: Low Risk  (11/8/2023)    Overall Financial Resource Strain (CARDIA)     Difficulty of Paying Living Expenses: Not hard at all   Food Insecurity: Not on file   Transportation Needs: No Transportation Needs (11/8/2023)    PRAPARE - Transportation     Lack of Transportation (Medical): No     Lack of Transportation (Non-Medical): No   Physical Activity: Not on file   Stress: Not on file   Social Connections: Not on file   Intimate Partner Violence: Not on file   Housing Stability: Not on file      Medications and Allergies:     Current Outpatient Medications   Medication Sig Dispense Refill    atorvastatin (LIPITOR) 20 mg tablet Take 1 tablet (20 mg total) by mouth daily 90 tablet 3    BD Insulin Syringe U/F 31G X 5/16" 1 ML MISC 2 (two) times a day      buprenorphine-naloxone (Suboxone) 8-2 mg Place 3 Film under the tongue in the morning      carvedilol (COREG) 12.5 mg tablet Take 1 tablet (12.5 mg total) by mouth 2 (two) times a day 60 tablet 5    cloNIDine (CATAPRES) 0.1 mg tablet Take 1 tablet by mouth in the morning      furosemide (LASIX) 20 mg tablet Take 1 tablet (20 mg total) by mouth daily TPN 90 tablet 3    gabapentin (NEURONTIN) 300 mg capsule Take 1 capsule (300 mg total) by mouth 2 (two) times a day 180 capsule 3    glipiZIDE (GLUCOTROL) 10 mg tablet Take 1 tablet (10 mg total) by mouth 2 (two) times a day 180 tablet 1    glucose blood (OneTouch Verio) test strip Test up to four times daily.  360 each 1    hydrALAZINE (APRESOLINE) 25 mg tablet Take 1 tablet (25 mg total) by mouth 3 (three) times a day 90 tablet 1    insulin aspart protamine-insulin aspart (NovoLOG Mix 70/30 FlexPen) 100 Units/mL injection pen Inject 22 Units under the skin 2 (two) times a day before meals 15 mL 5    Insulin Pen Needle (Pen Needles 5/16") 30G X 8 MM MISC Use 2 (two) times a day 180 each 1    levothyroxine 125 mcg tablet Take 1 tablet (125 mcg total) by mouth daily 90 tablet 1    lisinopril (ZESTRIL) 40 mg tablet Take 1 tablet (40 mg total) by mouth daily 90 tablet 1    metFORMIN (GLUCOPHAGE) 1000 MG tablet Take 1 tablet (1,000 mg total) by mouth 2 (two) times a day 180 tablet 1    mirtazapine (REMERON) 30 mg tablet Take 1 tablet by mouth in the morning      mupirocin (BACTROBAN) 2 % ointment apply to wound as directed 22 g 3    naloxone (NARCAN) 4 mg/0.1 mL nasal spray 1 spray into each nostril once as needed      tamsulosin (FLOMAX) 0.4 mg Take 1 capsule by mouth in the morning      buPROPion (WELLBUTRIN XL) 150 mg 24 hr tablet Take 2 tablets by mouth in the morning (Patient not taking: Reported on 6/7/2023)      nicotine (NICODERM CQ) 21 mg/24 hr TD 24 hr patch Place 1 patch on the skin every 24 hours (Patient not taking: Reported on 11/8/2023)       No current facility-administered medications for this visit. No Known Allergies   Immunizations: There is no immunization history on file for this patient. Health Maintenance:         Topic Date Due    Hepatitis C Screening  Never done    HIV Screening  Never done    Colorectal Cancer Screening  Never done         Topic Date Due    COVID-19 Vaccine (1) Never done    Pneumococcal Vaccine: Pediatrics (0 to 5 Years) and At-Risk Patients (6 to 59 Years) (1 - PCV) Never done    Influenza Vaccine (1) Never done      Medicare Screening Tests and Risk Assessments:     Gertrude Barkley is here for his Initial Wellness visit. Health Risk Assessment:   Patient rates overall health as fair. Patient feels that their physical health rating is slightly worse. Patient is satisfied with their life. Eyesight was rated as same. Hearing was rated as same. Patient feels that their emotional and mental health rating is much better. Patients states they are never, rarely angry. Patient states they are never, rarely unusually tired/fatigued. Pain experienced in the last 7 days has been some. Patient's pain rating has been 7/10.  Patient states that he has experienced no weight loss or gain in last 6 months. Depression Screening:   PHQ-2 Score: 0      Fall Risk Screening: In the past year, patient has experienced: no history of falling in past year      Home Safety:  Patient has trouble with stairs inside or outside of their home. Patient has working smoke alarms and has working carbon monoxide detector. Home safety hazards include: none. Nutrition:   Current diet is Regular. Medications:   Patient is currently taking over-the-counter supplements. OTC medications include: see medication list. Patient is able to manage medications. Activities of Daily Living (ADLs)/Instrumental Activities of Daily Living (IADLs):   Walk and transfer into and out of bed and chair?: Yes  Dress and groom yourself?: Yes    Bathe or shower yourself?: Yes    Feed yourself?  Yes  Do your laundry/housekeeping?: Yes  Manage your money, pay your bills and track your expenses?: Yes  Make your own meals?: Yes    Do your own shopping?: Yes    Previous Hospitalizations:   Any hospitalizations or ED visits within the last 12 months?: No      Advance Care Planning:   Living will: No    Durable POA for healthcare: No    Advanced directive: No    Advanced directive counseling given: Yes    ACP document given: Yes    End of Life Decisions reviewed with patient: Yes      Cognitive Screening:   Provider or family/friend/caregiver concerned regarding cognition?: No    PREVENTIVE SCREENINGS      Cardiovascular Screening:    General: Screening Not Indicated and History Lipid Disorder      Diabetes Screening:     General: Screening Not Indicated and History Diabetes      Colorectal Cancer Screening:     General: Screening Current      Prostate Cancer Screening:    General: Screening Current      Osteoporosis Screening:    General: Screening Not Indicated      Abdominal Aortic Aneurysm (AAA) Screening:    Risk factors include: tobacco use        Lung Cancer Screening:     General: Screening Not Indicated      Hepatitis C Screening: General: Risks and Benefits Discussed    Screening, Brief Intervention, and Referral to Treatment (SBIRT)    Screening  Typical number of drinks in a day: 0  Typical number of drinks in a week: 0  Interpretation: Low risk drinking behavior. AUDIT-C Screenin) How often did you have a drink containing alcohol in the past year? never  2) How many drinks did you have on a typical day when you were drinking in the past year? 0  3) How often did you have 6 or more drinks on one occasion in the past year? never    AUDIT-C Score: 0  Interpretation: Score 0-3 (male): Negative screen for alcohol misuse    Single Item Drug Screening:  How often have you used an illegal drug (including marijuana) or a prescription medication for non-medical reasons in the past year? never    Single Item Drug Screen Score: 0  Interpretation: Negative screen for possible drug use disorder    Review of Current Opioid Use  Opioid Risk Tool (ORT) Score: 0  Opioid Risk Tool (ORT) Interpretation: Score 0-3: Low risk for opioid misuse    Other Counseling Topics:   Sunscreen and calcium and vitamin D intake and regular weightbearing exercise. No results found. Physical Exam:     BP (!) 180/100 (BP Location: Left arm, Patient Position: Sitting, Cuff Size: Large)   Pulse 86   Temp 98.9 °F (37.2 °C) (Oral)   Resp 18   Ht 5' 9.75" (1.772 m)   Wt 106 kg (234 lb 3.2 oz)   SpO2 98%   BMI 33.85 kg/m²     Physical Exam  Vitals and nursing note reviewed. Constitutional:       General: He is not in acute distress. Appearance: He is well-developed. HENT:      Head: Normocephalic and atraumatic. Right Ear: Ear canal and external ear normal.      Left Ear: Ear canal and external ear normal.      Mouth/Throat:      Mouth: Mucous membranes are moist.      Pharynx: Oropharynx is clear. Eyes:      Conjunctiva/sclera: Conjunctivae normal.   Cardiovascular:      Rate and Rhythm: Normal rate and regular rhythm.       Heart sounds: Normal heart sounds. No murmur heard. Pulmonary:      Effort: Pulmonary effort is normal. No respiratory distress. Breath sounds: Normal breath sounds. Abdominal:      General: Abdomen is flat. Bowel sounds are normal.      Palpations: Abdomen is soft. Tenderness: There is no abdominal tenderness. Musculoskeletal:         General: No swelling. Cervical back: Neck supple. Skin:     General: Skin is warm and dry. Capillary Refill: Capillary refill takes less than 2 seconds. Neurological:      Mental Status: He is alert and oriented to person, place, and time. Psychiatric:         Mood and Affect: Mood normal.         Behavior: Behavior normal.         Thought Content:  Thought content normal.         Judgment: Judgment normal.          Alejandra Maryjane, DO

## 2023-11-09 ENCOUNTER — HOSPITAL ENCOUNTER (EMERGENCY)
Facility: HOSPITAL | Age: 53
Discharge: STILL ADMITTED OR ADMINISTRATIVE | End: 2023-11-09
Payer: COMMERCIAL

## 2023-11-09 ENCOUNTER — OFFICE VISIT (OUTPATIENT)
Dept: PODIATRY | Facility: CLINIC | Age: 53
End: 2023-11-09
Payer: COMMERCIAL

## 2023-11-09 ENCOUNTER — APPOINTMENT (OUTPATIENT)
Dept: LAB | Facility: HOSPITAL | Age: 53
End: 2023-11-09
Payer: COMMERCIAL

## 2023-11-09 VITALS
SYSTOLIC BLOOD PRESSURE: 179 MMHG | HEART RATE: 87 BPM | DIASTOLIC BLOOD PRESSURE: 107 MMHG | BODY MASS INDEX: 34.59 KG/M2 | HEIGHT: 69 IN | RESPIRATION RATE: 18 BRPM

## 2023-11-09 DIAGNOSIS — M72.2 PLANTAR FASCIITIS: ICD-10-CM

## 2023-11-09 DIAGNOSIS — Z79.4 DIABETES MELLITUS TYPE 2, INSULIN DEPENDENT (HCC): ICD-10-CM

## 2023-11-09 DIAGNOSIS — E11.9 DIABETES MELLITUS TYPE 2, INSULIN DEPENDENT (HCC): ICD-10-CM

## 2023-11-09 DIAGNOSIS — E11.42 DIABETIC POLYNEUROPATHY ASSOCIATED WITH TYPE 2 DIABETES MELLITUS (HCC): Primary | ICD-10-CM

## 2023-11-09 DIAGNOSIS — I70.90 ATHEROSCLEROSIS: ICD-10-CM

## 2023-11-09 DIAGNOSIS — M79.671 RIGHT FOOT PAIN: ICD-10-CM

## 2023-11-09 LAB
ALBUMIN SERPL BCP-MCNC: 4.5 G/DL (ref 3.5–5)
ALP SERPL-CCNC: 113 U/L (ref 34–104)
ALT SERPL W P-5'-P-CCNC: 24 U/L (ref 7–52)
ANION GAP SERPL CALCULATED.3IONS-SCNC: 8 MMOL/L
AST SERPL W P-5'-P-CCNC: 17 U/L (ref 13–39)
BILIRUB DIRECT SERPL-MCNC: 0.16 MG/DL (ref 0–0.2)
BILIRUB SERPL-MCNC: 0.54 MG/DL (ref 0.2–1)
BUN SERPL-MCNC: 24 MG/DL (ref 5–25)
CALCIUM SERPL-MCNC: 9.6 MG/DL (ref 8.4–10.2)
CHLORIDE SERPL-SCNC: 102 MMOL/L (ref 96–108)
CHOLEST SERPL-MCNC: 116 MG/DL
CO2 SERPL-SCNC: 30 MMOL/L (ref 21–32)
CREAT SERPL-MCNC: 1.14 MG/DL (ref 0.6–1.3)
CREAT UR-MCNC: 39.1 MG/DL
ERYTHROCYTE [DISTWIDTH] IN BLOOD BY AUTOMATED COUNT: 12.5 % (ref 11.6–15.1)
EST. AVERAGE GLUCOSE BLD GHB EST-MCNC: 177 MG/DL
GFR SERPL CREATININE-BSD FRML MDRD: 73 ML/MIN/1.73SQ M
GLUCOSE P FAST SERPL-MCNC: 180 MG/DL (ref 65–99)
HBA1C MFR BLD: 7.8 %
HCT VFR BLD AUTO: 42.8 % (ref 36.5–49.3)
HDLC SERPL-MCNC: 37 MG/DL
HGB BLD-MCNC: 14.6 G/DL (ref 12–17)
LDLC SERPL CALC-MCNC: 40 MG/DL (ref 0–100)
MCH RBC QN AUTO: 31.5 PG (ref 26.8–34.3)
MCHC RBC AUTO-ENTMCNC: 34.1 G/DL (ref 31.4–37.4)
MCV RBC AUTO: 92 FL (ref 82–98)
MICROALBUMIN UR-MCNC: 440.8 MG/L
MICROALBUMIN/CREAT 24H UR: 1127 MG/G CREATININE (ref 0–30)
NONHDLC SERPL-MCNC: 79 MG/DL
PLATELET # BLD AUTO: 175 THOUSANDS/UL (ref 149–390)
PMV BLD AUTO: 8.8 FL (ref 8.9–12.7)
POTASSIUM SERPL-SCNC: 4.5 MMOL/L (ref 3.5–5.3)
PROT SERPL-MCNC: 7.6 G/DL (ref 6.4–8.4)
PSA SERPL-MCNC: 0.55 NG/ML (ref 0–4)
RBC # BLD AUTO: 4.64 MILLION/UL (ref 3.88–5.62)
SODIUM SERPL-SCNC: 140 MMOL/L (ref 135–147)
TRIGL SERPL-MCNC: 196 MG/DL
WBC # BLD AUTO: 8.58 THOUSAND/UL (ref 4.31–10.16)

## 2023-11-09 PROCEDURE — 99203 OFFICE O/P NEW LOW 30 MIN: CPT | Performed by: PODIATRIST

## 2023-11-09 RX ORDER — MELOXICAM 15 MG/1
15 TABLET ORAL DAILY
Qty: 30 TABLET | Refills: 0 | Status: SHIPPED | OUTPATIENT
Start: 2023-11-09 | End: 2023-12-09

## 2023-11-09 NOTE — PROGRESS NOTES
Assessment/Plan:    Discussed principles of diabetic foot care. There is a palpable DP pulse bilateral but absent PT pulses bilateral.  There is extremely poor skin turgor. No sensation noted on the dorsum of each foot. MPJs are extremely tight with attempts of range of motion. Right plantar fascia is very sensitive with direct pressure. Skin overlying the fascia appears tight. Explained to patient that the right foot pain is multifactorial.  It could be Planter fasciitis pain however examination is atypical.  Patient was referred for arterial Doppler studies. Patient advised to increase gabapentin dosage from 300 mg twice daily to 2 tablets of 300 mg twice daily. He was placed on meloxicam 15 mg daily. Reassess in 4 weeks. No problem-specific Assessment & Plan notes found for this encounter. Diagnoses and all orders for this visit:    Diabetic polyneuropathy associated with type 2 diabetes mellitus ([address])  -     VAS lower limb arterial duplex, complete bilateral; Future    Right foot pain  -     Ambulatory Referral to Podiatry    Atherosclerosis  -     VAS lower limb arterial duplex, complete bilateral; Future    Plantar fasciitis  -     meloxicam (MOBIC) 15 mg tablet; Take 1 tablet (15 mg total) by mouth daily          Subjective:      Patient ID: Lizbeth Otto is a 47 y.o. male. HPI    Patient, a 11-year-old male complains of severe pain in his right foot primarily in the right arch. Patient states that the pain has been present for approximately 2 months. The pain is primarily present with extended standing and walking. No trauma related. When the pain initially occurred, patient had significant swelling and redness in his legs. Patient underwent a venous Doppler read as negative for DVT. The patient states that he takes OTC ibuprofen and has a modicum of relief. He cannot take too much of this medication due to GI upset. He relates no history of ulceration.     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 Patient is taking gabapentin 300 mg twice daily for approximately 1 year. I personally reviewed an A1c dated 11/9/2023. It was 7.8. I personally reviewed an A1c dated 11/8/2023. It was 7.7. The following portions of the patient's history were reviewed and updated as appropriate: allergies, current medications, past family history, past medical history, past social history, past surgical history, and problem list.    Review of Systems   Cardiovascular:  Positive for leg swelling. Gastrointestinal: Negative. Musculoskeletal:  Positive for gait problem. Objective:      BP (!) 179/107   Pulse 87   Resp 18   Ht 5' 9" (1.753 m)   BMI 34.59 kg/m²          Physical Exam  Cardiovascular:      Pulses: Pulses are weak. Dorsalis pedis pulses are 2+ on the right side and 2+ on the left side. Posterior tibial pulses are 0 on the right side and 0 on the left side. Feet:      Left foot:      Skin integrity: No callus. Diabetic Foot Exam    Patient's shoes and socks removed. Right Foot/Ankle   Right Foot Inspection      Toe Exam: erythema. Sensory   Vibration: absent  Proprioception: intact  Monofilament testing: diminished    Vascular  Capillary refills: < 3 seconds  The right DP pulse is 2+. The right PT pulse is 0. Right Toe  - Comprehensive Exam  Ecchymosis: none  Arch: normal  Hammertoes: absent  Claw Toes: absent  Swelling: none   Tenderness: plantar fascia       Left Foot/Ankle  Left Foot Inspection  Skin Exam: skin intact. No callus. Toe Exam: erythema. Sensory   Vibration: absent  Proprioception: intact  Monofilament testing: diminished    Vascular  Capillary refills: < 3 seconds  The left DP pulse is 2+. The left PT pulse is 0.      Left Toe  - Comprehensive Exam  Ecchymosis: none  Arch: normal  Hammertoes: absent  Claw toes: absent  Swelling: none   Tenderness: none       Assign Risk Category  Deformity present  Loss of protective sensation  Weak pulses  Risk: 2

## 2023-11-13 ENCOUNTER — HOSPITAL ENCOUNTER (OUTPATIENT)
Dept: NON INVASIVE DIAGNOSTICS | Facility: CLINIC | Age: 53
Discharge: HOME | End: 2023-11-13
Payer: COMMERCIAL

## 2023-11-13 DIAGNOSIS — E11.42 DIABETIC POLYNEUROPATHY ASSOCIATED WITH TYPE 2 DIABETES MELLITUS (HCC): ICD-10-CM

## 2023-11-13 DIAGNOSIS — I70.90 ATHEROSCLEROSIS: ICD-10-CM

## 2023-11-13 PROCEDURE — 93925 LOWER EXTREMITY STUDY: CPT

## 2023-11-13 PROCEDURE — 93925 LOWER EXTREMITY STUDY: CPT | Performed by: SURGERY

## 2023-11-13 PROCEDURE — 93923 UPR/LXTR ART STDY 3+ LVLS: CPT

## 2023-11-13 PROCEDURE — 93922 UPR/L XTREMITY ART 2 LEVELS: CPT | Performed by: SURGERY

## 2023-11-14 ENCOUNTER — TELEPHONE (OUTPATIENT)
Dept: FAMILY MEDICINE CLINIC | Facility: CLINIC | Age: 53
End: 2023-11-14

## 2023-11-20 ENCOUNTER — TELEPHONE (OUTPATIENT)
Dept: PODIATRY | Facility: CLINIC | Age: 53
End: 2023-11-20

## 2023-11-20 DIAGNOSIS — E11.42 DIABETIC POLYNEUROPATHY ASSOCIATED WITH TYPE 2 DIABETES MELLITUS (HCC): Primary | ICD-10-CM

## 2023-11-20 RX ORDER — GABAPENTIN 300 MG/1
300 CAPSULE ORAL 4 TIMES DAILY
Qty: 360 CAPSULE | Refills: 0 | Status: SHIPPED | OUTPATIENT
Start: 2023-11-20 | End: 2024-02-18

## 2023-11-20 NOTE — TELEPHONE ENCOUNTER
Called patient gave blood work results. Patient verbalized understanding and will call back if needed.

## 2023-12-07 ENCOUNTER — OFFICE VISIT (OUTPATIENT)
Dept: PODIATRY | Facility: CLINIC | Age: 53
End: 2023-12-07
Payer: COMMERCIAL

## 2023-12-07 VITALS
BODY MASS INDEX: 34.59 KG/M2 | HEART RATE: 89 BPM | SYSTOLIC BLOOD PRESSURE: 177 MMHG | RESPIRATION RATE: 18 BRPM | DIASTOLIC BLOOD PRESSURE: 111 MMHG | HEIGHT: 69 IN

## 2023-12-07 DIAGNOSIS — E11.42 DIABETIC POLYNEUROPATHY ASSOCIATED WITH TYPE 2 DIABETES MELLITUS (HCC): Primary | ICD-10-CM

## 2023-12-07 PROCEDURE — 99212 OFFICE O/P EST SF 10 MIN: CPT | Performed by: PODIATRIST

## 2023-12-07 NOTE — PROGRESS NOTES
Patient presents for pedal assessment. Patient had arterial Doppler studies on 11/13/2023. They were read as within normal limits bilateral.  JAYME was 1.2  Right lower limb and 1.17 left lower limb. Patient currently relates no foot pain. He responded well to the increased gabapentin dosage of 1200 mg daily. He only took meloxicam for a few days and discontinued. Patient advised to continue with the gabapentin at the 1200 mg dosage. He will be reassessed in 3 months.

## 2023-12-12 DIAGNOSIS — Z79.4 DIABETES MELLITUS TYPE 2, INSULIN DEPENDENT (HCC): ICD-10-CM

## 2023-12-12 DIAGNOSIS — E11.9 DIABETES MELLITUS TYPE 2, INSULIN DEPENDENT (HCC): ICD-10-CM

## 2023-12-12 RX ORDER — PEN NEEDLE, DIABETIC 30 GX5/16"
NEEDLE, DISPOSABLE MISCELLANEOUS 2 TIMES DAILY
Qty: 180 EACH | Refills: 1 | Status: SHIPPED | OUTPATIENT
Start: 2023-12-12 | End: 2024-06-09

## 2023-12-28 DIAGNOSIS — E11.9 DIABETES MELLITUS TYPE 2, INSULIN DEPENDENT (HCC): ICD-10-CM

## 2023-12-28 DIAGNOSIS — Z79.4 DIABETES MELLITUS TYPE 2, INSULIN DEPENDENT (HCC): ICD-10-CM

## 2024-01-24 DIAGNOSIS — I10 PRIMARY HYPERTENSION: ICD-10-CM

## 2024-01-24 RX ORDER — CARVEDILOL 12.5 MG/1
12.5 TABLET ORAL 2 TIMES DAILY
Qty: 60 TABLET | Refills: 5 | Status: SHIPPED | OUTPATIENT
Start: 2024-01-24

## 2024-01-31 DIAGNOSIS — I10 PRIMARY HYPERTENSION: ICD-10-CM

## 2024-01-31 RX ORDER — HYDRALAZINE HYDROCHLORIDE 25 MG/1
25 TABLET, FILM COATED ORAL 3 TIMES DAILY
Qty: 90 TABLET | Refills: 1 | Status: SHIPPED | OUTPATIENT
Start: 2024-01-31

## 2024-02-15 DIAGNOSIS — M72.2 PLANTAR FASCIITIS: ICD-10-CM

## 2024-02-15 DIAGNOSIS — I10 PRIMARY HYPERTENSION: ICD-10-CM

## 2024-02-15 RX ORDER — FUROSEMIDE 20 MG/1
20 TABLET ORAL DAILY
Qty: 90 TABLET | Refills: 1 | Status: SHIPPED | OUTPATIENT
Start: 2024-02-15

## 2024-02-15 RX ORDER — MELOXICAM 15 MG/1
15 TABLET ORAL DAILY
Qty: 90 TABLET | Refills: 1 | Status: SHIPPED | OUTPATIENT
Start: 2024-02-15 | End: 2024-08-13

## 2024-02-28 ENCOUNTER — TELEPHONE (OUTPATIENT)
Age: 54
End: 2024-02-28

## 2024-02-28 NOTE — TELEPHONE ENCOUNTER
PA for hydrALAZINE (APRESOLINE) 25 mg tablet     Submitted via    [x]CMM-KEY [id number]  []Surescripts-Case ID #   []Faxed to plan   []Other website   []Phone call Case ID #     Office notes sent, clinical questions answered. Awaiting determination    Turnaround time for your insurance to make a decision on your Prior Authorization can take 7-21 business days.

## 2024-03-20 ENCOUNTER — TELEPHONE (OUTPATIENT)
Dept: FAMILY MEDICINE CLINIC | Facility: CLINIC | Age: 54
End: 2024-03-20

## 2024-03-27 DIAGNOSIS — I10 PRIMARY HYPERTENSION: ICD-10-CM

## 2024-03-27 RX ORDER — HYDRALAZINE HYDROCHLORIDE 25 MG/1
25 TABLET, FILM COATED ORAL 3 TIMES DAILY
Qty: 90 TABLET | Refills: 1 | Status: SHIPPED | OUTPATIENT
Start: 2024-03-27

## 2024-08-08 DIAGNOSIS — S41.102A OPEN WOUND OF LEFT UPPER EXTREMITY, INITIAL ENCOUNTER: Primary | ICD-10-CM

## 2024-12-02 ENCOUNTER — TELEPHONE (OUTPATIENT)
Dept: FAMILY MEDICINE CLINIC | Facility: CLINIC | Age: 54
End: 2024-12-02

## 2025-04-01 ENCOUNTER — TELEPHONE (OUTPATIENT)
Dept: FAMILY MEDICINE CLINIC | Facility: CLINIC | Age: 55
End: 2025-04-01

## 2025-04-01 NOTE — LETTER
April 1, 2025    Tim Hoang  [address] [facility] [address]      Dear Mr. Hoang:      Our records shows that you are due for your wellness visit with Dr. Mario Duarte, If you are still under the care of our practice kindly  call to schedule an appointment today. If you are under the care of new primary care doctor please let our office aware so that we can update our records. Thank you in advance and we hope to hear from you soon.         Sincerely,  Mario Duarte, DO

## 2025-04-09 ENCOUNTER — TELEPHONE (OUTPATIENT)
Dept: FAMILY MEDICINE CLINIC | Facility: CLINIC | Age: 55
End: 2025-04-09

## 2025-04-09 NOTE — TELEPHONE ENCOUNTER
Due for yearly PE, foot and eye exam, A1c and others. Tried to call patient number is no longer working, reminder letter mail to patient

## 2025-04-09 NOTE — LETTER
April 9, 2025     Tim Hoang   [address] [facility] [address]      Dear Mr. Hoang:    In addition to helping you feel better when you are sick, we are interested in preventing illness and injury in the first place. In the spirit of maintaining your good health, our system indicates that you are due for the following:    Health Maintenance Due   Topic Date Due   • Hepatitis C Screening  Never done   • Diabetic Eye Exam  Never done   • HIV Screening  Never done   • Pneumococcal Vaccine: Pediatrics (0 to 5 Years) and At-Risk Patients (6 to 64 Years) (1 of 2 - PCV) Never done   • Colorectal Cancer Screening  Never done   • Zoster Vaccine (1 of 2) Never done   • HEMOGLOBIN A1C  05/09/2024   • Influenza Vaccine (1) Never done   • COVID-19 Vaccine (1 - 2024-25 season) Never done   • Depression Screening  11/08/2024   • Medicare Annual Wellness Visit (AWV)  11/08/2024   • Kidney Health Evaluation: GFR  11/09/2024   • Kidney Health Evaluation: Albumin/Creatinine Ratio  11/09/2024   • Diabetic Foot Exam  11/09/2024       Please call us to make an appointment at your earliest convenience. If you happen to be under the care of another doctor, please let us know so that we can update our records. I look forward to seeing you soon.        Sincerely,         Dr. Mario Duarte

## 2025-05-14 ENCOUNTER — VBI (OUTPATIENT)
Dept: ADMINISTRATIVE | Facility: OTHER | Age: 55
End: 2025-05-14

## 2025-05-14 NOTE — TELEPHONE ENCOUNTER
Patient contacted to schedule Annual Wellness Visit.   There was no answer / a message could not be left.     Thank you.  Denita Vasquez  [facility]

## 2025-07-24 ENCOUNTER — VBI (OUTPATIENT)
Dept: ADMINISTRATIVE | Facility: OTHER | Age: 55
End: 2025-07-24

## 2025-07-24 NOTE — TELEPHONE ENCOUNTER
Patient contacted to schedule Annual Wellness Visit.   There was no answer / a message could not be left.     Thank you.  Sarah Bernstein MA  [facility]